# Patient Record
Sex: FEMALE | Race: WHITE | NOT HISPANIC OR LATINO | Employment: OTHER | URBAN - METROPOLITAN AREA
[De-identification: names, ages, dates, MRNs, and addresses within clinical notes are randomized per-mention and may not be internally consistent; named-entity substitution may affect disease eponyms.]

---

## 2017-07-03 ENCOUNTER — CONVERSION ENCOUNTER (OUTPATIENT)
Dept: MAMMOGRAPHY | Facility: HOSPITAL | Age: 62
End: 2017-07-03

## 2017-07-10 ENCOUNTER — CONVERSION ENCOUNTER (OUTPATIENT)
Dept: MAMMOGRAPHY | Facility: HOSPITAL | Age: 62
End: 2017-07-10

## 2018-09-06 ENCOUNTER — OFFICE VISIT CONVERTED (OUTPATIENT)
Dept: GASTROENTEROLOGY | Facility: CLINIC | Age: 63
End: 2018-09-06
Attending: PHYSICIAN ASSISTANT

## 2018-11-27 ENCOUNTER — CONVERSION ENCOUNTER (OUTPATIENT)
Dept: MAMMOGRAPHY | Facility: HOSPITAL | Age: 63
End: 2018-11-27

## 2019-12-10 ENCOUNTER — HOSPITAL ENCOUNTER (OUTPATIENT)
Dept: MAMMOGRAPHY | Facility: HOSPITAL | Age: 64
Discharge: HOME | End: 2019-12-10
Attending: OBSTETRICS & GYNECOLOGY

## 2020-07-21 ENCOUNTER — HOSPITAL ENCOUNTER (OUTPATIENT)
Dept: GENERAL RADIOLOGY | Facility: HOSPITAL | Age: 65
Discharge: HOME | End: 2020-07-21
Attending: NURSE PRACTITIONER

## 2020-08-05 ENCOUNTER — OFFICE VISIT CONVERTED (OUTPATIENT)
Dept: ORTHOPEDIC SURGERY | Facility: CLINIC | Age: 65
End: 2020-08-05
Attending: ORTHOPAEDIC SURGERY

## 2020-12-28 ENCOUNTER — APPOINTMENT (OUTPATIENT)
Dept: PREADMISSION TESTING | Facility: HOSPITAL | Age: 65
End: 2020-12-28

## 2020-12-28 VITALS
WEIGHT: 172 LBS | BODY MASS INDEX: 27 KG/M2 | TEMPERATURE: 98.1 F | DIASTOLIC BLOOD PRESSURE: 79 MMHG | HEART RATE: 68 BPM | OXYGEN SATURATION: 96 % | HEIGHT: 67 IN | RESPIRATION RATE: 18 BRPM | SYSTOLIC BLOOD PRESSURE: 138 MMHG

## 2020-12-28 LAB
ANION GAP SERPL CALCULATED.3IONS-SCNC: 10 MMOL/L (ref 5–15)
BUN SERPL-MCNC: 14 MG/DL (ref 8–23)
BUN/CREAT SERPL: 22.6 (ref 7–25)
CALCIUM SPEC-SCNC: 9.6 MG/DL (ref 8.6–10.5)
CHLORIDE SERPL-SCNC: 105 MMOL/L (ref 98–107)
CO2 SERPL-SCNC: 24 MMOL/L (ref 22–29)
CREAT SERPL-MCNC: 0.62 MG/DL (ref 0.57–1)
DEPRECATED RDW RBC AUTO: 40 FL (ref 37–54)
ERYTHROCYTE [DISTWIDTH] IN BLOOD BY AUTOMATED COUNT: 12.4 % (ref 12.3–15.4)
GFR SERPL CREATININE-BSD FRML MDRD: 97 ML/MIN/1.73
GLUCOSE SERPL-MCNC: 90 MG/DL (ref 65–99)
HCT VFR BLD AUTO: 40.3 % (ref 34–46.6)
HGB BLD-MCNC: 13.8 G/DL (ref 12–15.9)
MCH RBC QN AUTO: 30.3 PG (ref 26.6–33)
MCHC RBC AUTO-ENTMCNC: 34.2 G/DL (ref 31.5–35.7)
MCV RBC AUTO: 88.6 FL (ref 79–97)
PLATELET # BLD AUTO: 261 10*3/MM3 (ref 140–450)
PMV BLD AUTO: 11.4 FL (ref 6–12)
POTASSIUM SERPL-SCNC: 4.3 MMOL/L (ref 3.5–5.2)
QT INTERVAL: 428 MS
RBC # BLD AUTO: 4.55 10*6/MM3 (ref 3.77–5.28)
SODIUM SERPL-SCNC: 139 MMOL/L (ref 136–145)
WBC # BLD AUTO: 7.76 10*3/MM3 (ref 3.4–10.8)

## 2020-12-28 PROCEDURE — 80048 BASIC METABOLIC PNL TOTAL CA: CPT

## 2020-12-28 PROCEDURE — U0004 COV-19 TEST NON-CDC HGH THRU: HCPCS

## 2020-12-28 PROCEDURE — 93010 ELECTROCARDIOGRAM REPORT: CPT | Performed by: INTERNAL MEDICINE

## 2020-12-28 PROCEDURE — 85027 COMPLETE CBC AUTOMATED: CPT

## 2020-12-28 PROCEDURE — 93005 ELECTROCARDIOGRAM TRACING: CPT

## 2020-12-28 PROCEDURE — 36415 COLL VENOUS BLD VENIPUNCTURE: CPT

## 2020-12-28 PROCEDURE — C9803 HOPD COVID-19 SPEC COLLECT: HCPCS

## 2020-12-28 RX ORDER — LOSARTAN POTASSIUM 50 MG/1
50 TABLET ORAL DAILY
COMMUNITY
Start: 2020-10-26

## 2020-12-28 RX ORDER — NAPROXEN 500 MG/1
500 TABLET ORAL 2 TIMES DAILY PRN
COMMUNITY
End: 2023-03-15 | Stop reason: HOSPADM

## 2020-12-28 RX ORDER — ASPIRIN 81 MG/1
81 TABLET ORAL DAILY
COMMUNITY

## 2020-12-28 RX ORDER — SIMVASTATIN 20 MG
20 TABLET ORAL NIGHTLY
COMMUNITY
Start: 2020-12-07

## 2020-12-28 RX ORDER — BUPROPION HYDROCHLORIDE 100 MG/1
100 TABLET, EXTENDED RELEASE ORAL 2 TIMES DAILY
COMMUNITY
Start: 2020-12-07

## 2020-12-28 RX ORDER — DEXTROMETHORPHAN HYDROBROMIDE AND PROMETHAZINE HYDROCHLORIDE 15; 6.25 MG/5ML; MG/5ML
SYRUP ORAL 4 TIMES DAILY PRN
COMMUNITY
Start: 2020-11-18 | End: 2023-02-17

## 2020-12-28 RX ORDER — ESTRADIOL 0.5 MG/1
0.5 TABLET ORAL DAILY
COMMUNITY
Start: 2020-10-31 | End: 2021-10-01 | Stop reason: SDUPTHER

## 2020-12-29 LAB — SARS-COV-2 RNA RESP QL NAA+PROBE: NOT DETECTED

## 2020-12-31 ENCOUNTER — ANESTHESIA (OUTPATIENT)
Dept: PERIOP | Facility: HOSPITAL | Age: 65
End: 2020-12-31

## 2020-12-31 ENCOUNTER — HOSPITAL ENCOUNTER (OUTPATIENT)
Facility: HOSPITAL | Age: 65
Discharge: HOME | End: 2021-01-01
Attending: OBSTETRICS & GYNECOLOGY | Admitting: OBSTETRICS & GYNECOLOGY

## 2020-12-31 ENCOUNTER — ANESTHESIA EVENT (OUTPATIENT)
Dept: PERIOP | Facility: HOSPITAL | Age: 65
End: 2020-12-31
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
## 2020-12-31 DIAGNOSIS — N99.3 PROLAPSE, POST-HYSTERECTOMY: ICD-10-CM

## 2020-12-31 LAB
HBV SURFACE AG SERPL QL IA: NORMAL
HCV AB SER DONR QL: NORMAL
HIV1+2 AB SER QL: NORMAL

## 2020-12-31 PROCEDURE — C1763 CONN TISS, NON-HUMAN: HCPCS | Performed by: OBSTETRICS & GYNECOLOGY

## 2020-12-31 PROCEDURE — 25010000002 MAGNESIUM SULFATE PER 500 MG OF MAGNESIUM: Performed by: NURSE ANESTHETIST, CERTIFIED REGISTERED

## 2020-12-31 PROCEDURE — 25010000002 PHENYLEPHRINE PER 1 ML: Performed by: NURSE ANESTHETIST, CERTIFIED REGISTERED

## 2020-12-31 PROCEDURE — 25010000002 MIDAZOLAM PER 1 MG: Performed by: NURSE ANESTHETIST, CERTIFIED REGISTERED

## 2020-12-31 PROCEDURE — 25010000002 NEOSTIGMINE PER 0.5 MG: Performed by: NURSE ANESTHETIST, CERTIFIED REGISTERED

## 2020-12-31 PROCEDURE — 25010000002 HYDROMORPHONE PER 4 MG: Performed by: NURSE ANESTHETIST, CERTIFIED REGISTERED

## 2020-12-31 PROCEDURE — 25010000003 CEFAZOLIN IN DEXTROSE 2-4 GM/100ML-% SOLUTION: Performed by: NURSE ANESTHETIST, CERTIFIED REGISTERED

## 2020-12-31 PROCEDURE — 25010000002 ALBUMIN HUMAN 5% PER 50 ML: Performed by: NURSE ANESTHETIST, CERTIFIED REGISTERED

## 2020-12-31 PROCEDURE — P9041 ALBUMIN (HUMAN),5%, 50ML: HCPCS | Performed by: NURSE ANESTHETIST, CERTIFIED REGISTERED

## 2020-12-31 PROCEDURE — G0378 HOSPITAL OBSERVATION PER HR: HCPCS

## 2020-12-31 PROCEDURE — 25010000003 CEFAZOLIN IN DEXTROSE 2-4 GM/100ML-% SOLUTION: Performed by: STUDENT IN AN ORGANIZED HEALTH CARE EDUCATION/TRAINING PROGRAM

## 2020-12-31 PROCEDURE — 86803 HEPATITIS C AB TEST: CPT | Performed by: OBSTETRICS & GYNECOLOGY

## 2020-12-31 PROCEDURE — 25010000002 EPINEPHRINE PER 0.1 MG: Performed by: OBSTETRICS & GYNECOLOGY

## 2020-12-31 PROCEDURE — 25010000002 DEXAMETHASONE PER 1 MG: Performed by: NURSE ANESTHETIST, CERTIFIED REGISTERED

## 2020-12-31 PROCEDURE — 88305 TISSUE EXAM BY PATHOLOGIST: CPT | Performed by: OBSTETRICS & GYNECOLOGY

## 2020-12-31 PROCEDURE — 25010000002 ONDANSETRON PER 1 MG: Performed by: NURSE ANESTHETIST, CERTIFIED REGISTERED

## 2020-12-31 PROCEDURE — 25010000002 PROPOFOL 10 MG/ML EMULSION: Performed by: NURSE ANESTHETIST, CERTIFIED REGISTERED

## 2020-12-31 PROCEDURE — 87340 HEPATITIS B SURFACE AG IA: CPT | Performed by: OBSTETRICS & GYNECOLOGY

## 2020-12-31 PROCEDURE — G0432 EIA HIV-1/HIV-2 SCREEN: HCPCS | Performed by: OBSTETRICS & GYNECOLOGY

## 2020-12-31 PROCEDURE — 25010000002 FENTANYL CITRATE (PF) 100 MCG/2ML SOLUTION: Performed by: NURSE ANESTHETIST, CERTIFIED REGISTERED

## 2020-12-31 PROCEDURE — 87521 HEPATITIS C PROBE&RVRS TRNSC: CPT | Performed by: OBSTETRICS & GYNECOLOGY

## 2020-12-31 DEVICE — POLYPROPYLENE MESH FOR SACROCOLPOSUSPENSION/SACROCOLPOPEXY - Y
Type: IMPLANTABLE DEVICE | Site: VAGINA | Status: FUNCTIONAL
Brand: RESTORELLE

## 2020-12-31 RX ORDER — LOSARTAN POTASSIUM 50 MG/1
50 TABLET ORAL DAILY
Status: DISCONTINUED | OUTPATIENT
Start: 2020-12-31 | End: 2021-01-01 | Stop reason: HOSPADM

## 2020-12-31 RX ORDER — NALOXONE HCL 0.4 MG/ML
0.2 VIAL (ML) INJECTION AS NEEDED
Status: DISCONTINUED | OUTPATIENT
Start: 2020-12-31 | End: 2020-12-31 | Stop reason: HOSPADM

## 2020-12-31 RX ORDER — LIDOCAINE HYDROCHLORIDE 20 MG/ML
INJECTION, SOLUTION INFILTRATION; PERINEURAL AS NEEDED
Status: DISCONTINUED | OUTPATIENT
Start: 2020-12-31 | End: 2020-12-31 | Stop reason: SURG

## 2020-12-31 RX ORDER — LABETALOL HYDROCHLORIDE 5 MG/ML
5 INJECTION, SOLUTION INTRAVENOUS
Status: DISCONTINUED | OUTPATIENT
Start: 2020-12-31 | End: 2020-12-31 | Stop reason: HOSPADM

## 2020-12-31 RX ORDER — HYDROCODONE BITARTRATE AND ACETAMINOPHEN 7.5; 325 MG/1; MG/1
1 TABLET ORAL ONCE AS NEEDED
Status: DISCONTINUED | OUTPATIENT
Start: 2020-12-31 | End: 2020-12-31 | Stop reason: HOSPADM

## 2020-12-31 RX ORDER — PROMETHAZINE HYDROCHLORIDE 25 MG/1
25 SUPPOSITORY RECTAL ONCE AS NEEDED
Status: DISCONTINUED | OUTPATIENT
Start: 2020-12-31 | End: 2020-12-31 | Stop reason: HOSPADM

## 2020-12-31 RX ORDER — FENTANYL CITRATE 50 UG/ML
50 INJECTION, SOLUTION INTRAMUSCULAR; INTRAVENOUS
Status: DISCONTINUED | OUTPATIENT
Start: 2020-12-31 | End: 2020-12-31 | Stop reason: HOSPADM

## 2020-12-31 RX ORDER — MORPHINE SULFATE 2 MG/ML
1 INJECTION, SOLUTION INTRAMUSCULAR; INTRAVENOUS EVERY 4 HOURS PRN
Status: DISCONTINUED | OUTPATIENT
Start: 2020-12-31 | End: 2021-01-01 | Stop reason: HOSPADM

## 2020-12-31 RX ORDER — HYDROCODONE BITARTRATE AND ACETAMINOPHEN 5; 325 MG/1; MG/1
1 TABLET ORAL EVERY 4 HOURS PRN
Status: DISCONTINUED | OUTPATIENT
Start: 2020-12-31 | End: 2021-01-01 | Stop reason: HOSPADM

## 2020-12-31 RX ORDER — SODIUM CHLORIDE, SODIUM LACTATE, POTASSIUM CHLORIDE, CALCIUM CHLORIDE 600; 310; 30; 20 MG/100ML; MG/100ML; MG/100ML; MG/100ML
9 INJECTION, SOLUTION INTRAVENOUS CONTINUOUS
Status: DISCONTINUED | OUTPATIENT
Start: 2020-12-31 | End: 2020-12-31 | Stop reason: HOSPADM

## 2020-12-31 RX ORDER — ONDANSETRON 2 MG/ML
4 INJECTION INTRAMUSCULAR; INTRAVENOUS EVERY 6 HOURS PRN
Status: DISCONTINUED | OUTPATIENT
Start: 2020-12-31 | End: 2021-01-01 | Stop reason: HOSPADM

## 2020-12-31 RX ORDER — DEXTROSE MONOHYDRATE 25 G/50ML
INJECTION, SOLUTION INTRAVENOUS AS NEEDED
Status: DISCONTINUED | OUTPATIENT
Start: 2020-12-31 | End: 2020-12-31 | Stop reason: HOSPADM

## 2020-12-31 RX ORDER — HYDROCODONE BITARTRATE AND ACETAMINOPHEN 10; 325 MG/1; MG/1
1 TABLET ORAL EVERY 4 HOURS PRN
Status: DISCONTINUED | OUTPATIENT
Start: 2020-12-31 | End: 2021-01-01 | Stop reason: HOSPADM

## 2020-12-31 RX ORDER — FLUMAZENIL 0.1 MG/ML
0.2 INJECTION INTRAVENOUS AS NEEDED
Status: DISCONTINUED | OUTPATIENT
Start: 2020-12-31 | End: 2020-12-31 | Stop reason: HOSPADM

## 2020-12-31 RX ORDER — MIDAZOLAM HYDROCHLORIDE 1 MG/ML
INJECTION INTRAMUSCULAR; INTRAVENOUS AS NEEDED
Status: DISCONTINUED | OUTPATIENT
Start: 2020-12-31 | End: 2020-12-31 | Stop reason: SURG

## 2020-12-31 RX ORDER — SODIUM CHLORIDE 0.9 % (FLUSH) 0.9 %
10 SYRINGE (ML) INJECTION EVERY 12 HOURS SCHEDULED
Status: DISCONTINUED | OUTPATIENT
Start: 2020-12-31 | End: 2021-01-01 | Stop reason: HOSPADM

## 2020-12-31 RX ORDER — CEFAZOLIN SODIUM 2 G/100ML
INJECTION, SOLUTION INTRAVENOUS AS NEEDED
Status: DISCONTINUED | OUTPATIENT
Start: 2020-12-31 | End: 2020-12-31 | Stop reason: SURG

## 2020-12-31 RX ORDER — OXYCODONE AND ACETAMINOPHEN 7.5; 325 MG/1; MG/1
1 TABLET ORAL ONCE AS NEEDED
Status: DISCONTINUED | OUTPATIENT
Start: 2020-12-31 | End: 2020-12-31 | Stop reason: HOSPADM

## 2020-12-31 RX ORDER — FENTANYL CITRATE 50 UG/ML
INJECTION, SOLUTION INTRAMUSCULAR; INTRAVENOUS AS NEEDED
Status: DISCONTINUED | OUTPATIENT
Start: 2020-12-31 | End: 2020-12-31 | Stop reason: SURG

## 2020-12-31 RX ORDER — DIPHENHYDRAMINE HYDROCHLORIDE 50 MG/ML
12.5 INJECTION INTRAMUSCULAR; INTRAVENOUS
Status: DISCONTINUED | OUTPATIENT
Start: 2020-12-31 | End: 2020-12-31 | Stop reason: HOSPADM

## 2020-12-31 RX ORDER — SODIUM CHLORIDE 0.9 % (FLUSH) 0.9 %
10 SYRINGE (ML) INJECTION AS NEEDED
Status: DISCONTINUED | OUTPATIENT
Start: 2020-12-31 | End: 2020-12-31 | Stop reason: HOSPADM

## 2020-12-31 RX ORDER — GLYCOPYRROLATE 0.2 MG/ML
INJECTION INTRAMUSCULAR; INTRAVENOUS AS NEEDED
Status: DISCONTINUED | OUTPATIENT
Start: 2020-12-31 | End: 2020-12-31 | Stop reason: SURG

## 2020-12-31 RX ORDER — PROPOFOL 10 MG/ML
VIAL (ML) INTRAVENOUS AS NEEDED
Status: DISCONTINUED | OUTPATIENT
Start: 2020-12-31 | End: 2020-12-31 | Stop reason: SURG

## 2020-12-31 RX ORDER — DIPHENHYDRAMINE HCL 25 MG
25 CAPSULE ORAL
Status: DISCONTINUED | OUTPATIENT
Start: 2020-12-31 | End: 2020-12-31 | Stop reason: HOSPADM

## 2020-12-31 RX ORDER — ROCURONIUM BROMIDE 10 MG/ML
INJECTION, SOLUTION INTRAVENOUS AS NEEDED
Status: DISCONTINUED | OUTPATIENT
Start: 2020-12-31 | End: 2020-12-31 | Stop reason: SURG

## 2020-12-31 RX ORDER — SODIUM CHLORIDE 0.9 % (FLUSH) 0.9 %
10 SYRINGE (ML) INJECTION AS NEEDED
Status: DISCONTINUED | OUTPATIENT
Start: 2020-12-31 | End: 2021-01-01 | Stop reason: HOSPADM

## 2020-12-31 RX ORDER — ONDANSETRON 2 MG/ML
4 INJECTION INTRAMUSCULAR; INTRAVENOUS ONCE AS NEEDED
Status: DISCONTINUED | OUTPATIENT
Start: 2020-12-31 | End: 2020-12-31 | Stop reason: HOSPADM

## 2020-12-31 RX ORDER — ONDANSETRON 2 MG/ML
INJECTION INTRAMUSCULAR; INTRAVENOUS AS NEEDED
Status: DISCONTINUED | OUTPATIENT
Start: 2020-12-31 | End: 2020-12-31 | Stop reason: SURG

## 2020-12-31 RX ORDER — SCOLOPAMINE TRANSDERMAL SYSTEM 1 MG/1
1 PATCH, EXTENDED RELEASE TRANSDERMAL ONCE
Status: DISCONTINUED | OUTPATIENT
Start: 2020-12-31 | End: 2020-12-31

## 2020-12-31 RX ORDER — CEFAZOLIN SODIUM 2 G/100ML
2 INJECTION, SOLUTION INTRAVENOUS ONCE
Status: COMPLETED | OUTPATIENT
Start: 2020-12-31 | End: 2021-01-01

## 2020-12-31 RX ORDER — SODIUM CHLORIDE 0.9 % (FLUSH) 0.9 %
3 SYRINGE (ML) INJECTION EVERY 12 HOURS SCHEDULED
Status: DISCONTINUED | OUTPATIENT
Start: 2020-12-31 | End: 2020-12-31 | Stop reason: HOSPADM

## 2020-12-31 RX ORDER — DEXAMETHASONE SODIUM PHOSPHATE 4 MG/ML
INJECTION, SOLUTION INTRA-ARTICULAR; INTRALESIONAL; INTRAMUSCULAR; INTRAVENOUS; SOFT TISSUE AS NEEDED
Status: DISCONTINUED | OUTPATIENT
Start: 2020-12-31 | End: 2020-12-31 | Stop reason: SURG

## 2020-12-31 RX ORDER — AMOXICILLIN 250 MG
2 CAPSULE ORAL 2 TIMES DAILY
Status: DISCONTINUED | OUTPATIENT
Start: 2020-12-31 | End: 2021-01-01 | Stop reason: HOSPADM

## 2020-12-31 RX ORDER — SODIUM CHLORIDE 9 MG/ML
75 INJECTION, SOLUTION INTRAVENOUS CONTINUOUS
Status: DISCONTINUED | OUTPATIENT
Start: 2020-12-31 | End: 2021-01-01 | Stop reason: HOSPADM

## 2020-12-31 RX ORDER — HYDROMORPHONE HCL 110MG/55ML
PATIENT CONTROLLED ANALGESIA SYRINGE INTRAVENOUS AS NEEDED
Status: DISCONTINUED | OUTPATIENT
Start: 2020-12-31 | End: 2020-12-31 | Stop reason: SURG

## 2020-12-31 RX ORDER — EPHEDRINE SULFATE 50 MG/ML
5 INJECTION, SOLUTION INTRAVENOUS ONCE AS NEEDED
Status: DISCONTINUED | OUTPATIENT
Start: 2020-12-31 | End: 2020-12-31 | Stop reason: HOSPADM

## 2020-12-31 RX ORDER — HYDROMORPHONE HYDROCHLORIDE 1 MG/ML
0.5 INJECTION, SOLUTION INTRAMUSCULAR; INTRAVENOUS; SUBCUTANEOUS
Status: DISCONTINUED | OUTPATIENT
Start: 2020-12-31 | End: 2020-12-31 | Stop reason: HOSPADM

## 2020-12-31 RX ORDER — CALCIUM CARBONATE 200(500)MG
1 TABLET,CHEWABLE ORAL 2 TIMES DAILY PRN
Status: DISCONTINUED | OUTPATIENT
Start: 2020-12-31 | End: 2021-01-01 | Stop reason: HOSPADM

## 2020-12-31 RX ORDER — SODIUM CHLORIDE 0.9 % (FLUSH) 0.9 %
3-10 SYRINGE (ML) INJECTION AS NEEDED
Status: DISCONTINUED | OUTPATIENT
Start: 2020-12-31 | End: 2020-12-31 | Stop reason: HOSPADM

## 2020-12-31 RX ORDER — ALBUMIN, HUMAN INJ 5% 5 %
SOLUTION INTRAVENOUS CONTINUOUS PRN
Status: DISCONTINUED | OUTPATIENT
Start: 2020-12-31 | End: 2020-12-31 | Stop reason: SURG

## 2020-12-31 RX ORDER — MAGNESIUM SULFATE HEPTAHYDRATE 500 MG/ML
INJECTION, SOLUTION INTRAMUSCULAR; INTRAVENOUS AS NEEDED
Status: DISCONTINUED | OUTPATIENT
Start: 2020-12-31 | End: 2020-12-31 | Stop reason: SURG

## 2020-12-31 RX ORDER — SODIUM CHLORIDE 9 MG/ML
INJECTION, SOLUTION INTRAVENOUS AS NEEDED
Status: DISCONTINUED | OUTPATIENT
Start: 2020-12-31 | End: 2020-12-31 | Stop reason: HOSPADM

## 2020-12-31 RX ORDER — BUPROPION HYDROCHLORIDE 100 MG/1
100 TABLET, EXTENDED RELEASE ORAL 2 TIMES DAILY
Status: DISCONTINUED | OUTPATIENT
Start: 2020-12-31 | End: 2021-01-01 | Stop reason: HOSPADM

## 2020-12-31 RX ORDER — LIDOCAINE HYDROCHLORIDE 10 MG/ML
0.5 INJECTION, SOLUTION EPIDURAL; INFILTRATION; INTRACAUDAL; PERINEURAL ONCE AS NEEDED
Status: DISCONTINUED | OUTPATIENT
Start: 2020-12-31 | End: 2020-12-31 | Stop reason: HOSPADM

## 2020-12-31 RX ORDER — MAGNESIUM HYDROXIDE 1200 MG/15ML
LIQUID ORAL AS NEEDED
Status: DISCONTINUED | OUTPATIENT
Start: 2020-12-31 | End: 2020-12-31 | Stop reason: HOSPADM

## 2020-12-31 RX ORDER — NALOXONE HCL 0.4 MG/ML
0.4 VIAL (ML) INJECTION
Status: DISCONTINUED | OUTPATIENT
Start: 2020-12-31 | End: 2021-01-01 | Stop reason: HOSPADM

## 2020-12-31 RX ORDER — MIDAZOLAM HYDROCHLORIDE 1 MG/ML
1 INJECTION INTRAMUSCULAR; INTRAVENOUS
Status: DISCONTINUED | OUTPATIENT
Start: 2020-12-31 | End: 2020-12-31 | Stop reason: HOSPADM

## 2020-12-31 RX ORDER — PROMETHAZINE HYDROCHLORIDE 25 MG/1
25 TABLET ORAL ONCE AS NEEDED
Status: DISCONTINUED | OUTPATIENT
Start: 2020-12-31 | End: 2020-12-31 | Stop reason: HOSPADM

## 2020-12-31 RX ORDER — FAMOTIDINE 10 MG/ML
20 INJECTION, SOLUTION INTRAVENOUS ONCE
Status: COMPLETED | OUTPATIENT
Start: 2020-12-31 | End: 2020-12-31

## 2020-12-31 RX ORDER — KETAMINE HYDROCHLORIDE 10 MG/ML
INJECTION INTRAMUSCULAR; INTRAVENOUS AS NEEDED
Status: DISCONTINUED | OUTPATIENT
Start: 2020-12-31 | End: 2020-12-31 | Stop reason: SURG

## 2020-12-31 RX ADMIN — HYDROCODONE BITARTRATE AND ACETAMINOPHEN 1 TABLET: 10; 325 TABLET ORAL at 21:44

## 2020-12-31 RX ADMIN — ALBUMIN HUMAN: 0.05 INJECTION, SOLUTION INTRAVENOUS at 13:07

## 2020-12-31 RX ADMIN — SODIUM CHLORIDE, PRESERVATIVE FREE 10 ML: 5 INJECTION INTRAVENOUS at 21:52

## 2020-12-31 RX ADMIN — CEFAZOLIN SODIUM 2 G: 2 INJECTION, SOLUTION INTRAVENOUS at 07:24

## 2020-12-31 RX ADMIN — FAMOTIDINE 20 MG: 10 INJECTION INTRAVENOUS at 07:02

## 2020-12-31 RX ADMIN — MAGNESIUM SULFATE HEPTAHYDRATE 2 G: 500 INJECTION, SOLUTION INTRAMUSCULAR; INTRAVENOUS at 08:20

## 2020-12-31 RX ADMIN — NEOSTIGMINE METHYLSULFATE 2 MG: 1 INJECTION INTRAMUSCULAR; INTRAVENOUS; SUBCUTANEOUS at 13:36

## 2020-12-31 RX ADMIN — KETAMINE HYDROCHLORIDE 10 MG: 10 INJECTION INTRAMUSCULAR; INTRAVENOUS at 09:09

## 2020-12-31 RX ADMIN — PHENYLEPHRINE HYDROCHLORIDE 200 MCG: 10 INJECTION INTRAVENOUS at 08:13

## 2020-12-31 RX ADMIN — PHENYLEPHRINE HYDROCHLORIDE 200 MCG: 10 INJECTION INTRAVENOUS at 07:53

## 2020-12-31 RX ADMIN — GLYCOPYRROLATE 0.4 MG: 0.2 INJECTION INTRAMUSCULAR; INTRAVENOUS at 13:36

## 2020-12-31 RX ADMIN — ROCURONIUM BROMIDE 20 MG: 10 INJECTION INTRAVENOUS at 11:06

## 2020-12-31 RX ADMIN — ROCURONIUM BROMIDE 10 MG: 10 INJECTION INTRAVENOUS at 12:49

## 2020-12-31 RX ADMIN — CEFAZOLIN SODIUM 2 G: 2 INJECTION, SOLUTION INTRAVENOUS at 11:24

## 2020-12-31 RX ADMIN — SODIUM CHLORIDE, POTASSIUM CHLORIDE, SODIUM LACTATE AND CALCIUM CHLORIDE 9 ML/HR: 600; 310; 30; 20 INJECTION, SOLUTION INTRAVENOUS at 07:01

## 2020-12-31 RX ADMIN — SCOPALAMINE 1 PATCH: 1 PATCH, EXTENDED RELEASE TRANSDERMAL at 07:02

## 2020-12-31 RX ADMIN — DEXAMETHASONE SODIUM PHOSPHATE 8 MG: 4 INJECTION INTRA-ARTICULAR; INTRALESIONAL; INTRAMUSCULAR; INTRAVENOUS; SOFT TISSUE at 09:04

## 2020-12-31 RX ADMIN — ROCURONIUM BROMIDE 20 MG: 10 INJECTION INTRAVENOUS at 09:26

## 2020-12-31 RX ADMIN — PHENYLEPHRINE HYDROCHLORIDE 100 MCG: 10 INJECTION INTRAVENOUS at 11:59

## 2020-12-31 RX ADMIN — SODIUM CHLORIDE, POTASSIUM CHLORIDE, SODIUM LACTATE AND CALCIUM CHLORIDE: 600; 310; 30; 20 INJECTION, SOLUTION INTRAVENOUS at 12:46

## 2020-12-31 RX ADMIN — FENTANYL CITRATE 100 MCG: 50 INJECTION INTRAMUSCULAR; INTRAVENOUS at 07:34

## 2020-12-31 RX ADMIN — ROCURONIUM BROMIDE 50 MG: 10 INJECTION INTRAVENOUS at 07:42

## 2020-12-31 RX ADMIN — PROPOFOL 150 MG: 10 INJECTION, EMULSION INTRAVENOUS at 07:41

## 2020-12-31 RX ADMIN — SODIUM CHLORIDE 75 ML/HR: 9 INJECTION, SOLUTION INTRAVENOUS at 16:09

## 2020-12-31 RX ADMIN — LIDOCAINE HYDROCHLORIDE 100 MG: 20 INJECTION, SOLUTION INFILTRATION; PERINEURAL at 07:41

## 2020-12-31 RX ADMIN — BUPROPION HYDROCHLORIDE 100 MG: 100 TABLET, EXTENDED RELEASE ORAL at 21:46

## 2020-12-31 RX ADMIN — KETAMINE HYDROCHLORIDE 40 MG: 10 INJECTION INTRAMUSCULAR; INTRAVENOUS at 08:09

## 2020-12-31 RX ADMIN — PHENYLEPHRINE HYDROCHLORIDE 200 MCG: 10 INJECTION INTRAVENOUS at 07:46

## 2020-12-31 RX ADMIN — DOCUSATE SODIUM 50MG AND SENNOSIDES 8.6MG 2 TABLET: 8.6; 5 TABLET, FILM COATED ORAL at 21:44

## 2020-12-31 RX ADMIN — HYDROMORPHONE HYDROCHLORIDE 0.5 MG: 2 INJECTION, SOLUTION INTRAMUSCULAR; INTRAVENOUS; SUBCUTANEOUS at 13:47

## 2020-12-31 RX ADMIN — SODIUM CHLORIDE, POTASSIUM CHLORIDE, SODIUM LACTATE AND CALCIUM CHLORIDE 9 ML/HR: 600; 310; 30; 20 INJECTION, SOLUTION INTRAVENOUS at 06:25

## 2020-12-31 RX ADMIN — MIDAZOLAM 2 MG: 1 INJECTION INTRAMUSCULAR; INTRAVENOUS at 08:06

## 2020-12-31 RX ADMIN — ONDANSETRON HYDROCHLORIDE 4 MG: 2 SOLUTION INTRAMUSCULAR; INTRAVENOUS at 13:15

## 2020-12-31 RX ADMIN — HYDROMORPHONE HYDROCHLORIDE 0.5 MG: 2 INJECTION, SOLUTION INTRAMUSCULAR; INTRAVENOUS; SUBCUTANEOUS at 13:35

## 2020-12-31 NOTE — ANESTHESIA PREPROCEDURE EVALUATION
Anesthesia Evaluation     Patient summary reviewed and Nursing notes reviewed   NPO Solid Status: > 8 hours  NPO Liquid Status: > 8 hours           Airway   Mallampati: II  Neck ROM: full  No difficulty expected  Dental - normal exam     Pulmonary     breath sounds clear to auscultation  Cardiovascular     Rhythm: regular    (+) hypertension, hyperlipidemia,       Neuro/Psych  (+) psychiatric history Depression,     GI/Hepatic/Renal/Endo      Musculoskeletal     Abdominal    Substance History      OB/GYN          Other                        Anesthesia Plan    ASA 2     general     intravenous induction     Anesthetic plan, all risks, benefits, and alternatives have been provided, discussed and informed consent has been obtained with: patient.

## 2020-12-31 NOTE — ANESTHESIA POSTPROCEDURE EVALUATION
Patient: Lorena Thorne    Procedure Summary     Date: 12/31/20 Room / Location: [facility] OR 09 / [facility] MAIN OR    Anesthesia Start: 0729 Anesthesia Stop: 1415    Procedure: ROBOTIC LAPAROSCOPIC SACRAL COLPOPEXY, BILATERAL SALPINGO-OOPHORECTOMY, CYSTOSCOPY, POSTERIOR REPAIR, CYSTOTOMY REPAIR (N/A Abdomen) Diagnosis:     Surgeon: Filiberto Alberts MD Provider: Edward Rey MD    Anesthesia Type: general ASA Status: 2          Anesthesia Type: general    Vitals  Vitals Value Taken Time   /71 12/31/20 1530   Temp 36.4 °C (97.6 °F) 12/31/20 1530   Pulse 89 12/31/20 1545   Resp 16 12/31/20 1530   SpO2 98 % 12/31/20 1545   Vitals shown include unvalidated device data.        Anesthesia Post Evaluation

## 2020-12-31 NOTE — ANESTHESIA PROCEDURE NOTES
Airway  Urgency: elective    Date/Time: 12/31/2020 7:44 AM  Airway not difficult    General Information and Staff    Patient location during procedure: OR  Anesthesiologist: Edward Rey MD  CRNA: Brielle Meehan CRNA    Indications and Patient Condition  Indications for airway management: airway protection    Preoxygenated: yes  Mask difficulty assessment: 2 - vent by mask + OA or adjuvant +/- NMBA    Final Airway Details  Final airway type: endotracheal airway      Successful airway: ETT  Cuffed: yes   Successful intubation technique: direct laryngoscopy  Facilitating devices/methods: cricoid pressure  Endotracheal tube insertion site: oral  Blade: Aguiar  Blade size: 2  ETT size (mm): 7.0  Cormack-Lehane Classification: grade IIa - partial view of glottis  Placement verified by: chest auscultation and capnometry   Cuff volume (mL): 4  Measured from: lips  ETT/EBT  to lips (cm): 21  Number of attempts at approach: 1  Assessment: lips, teeth, and gum same as pre-op    Additional Comments  EBBS: ETCO2+: Atraumatic intubation

## 2021-01-01 VITALS
SYSTOLIC BLOOD PRESSURE: 112 MMHG | WEIGHT: 170.86 LBS | BODY MASS INDEX: 26.82 KG/M2 | TEMPERATURE: 97.5 F | DIASTOLIC BLOOD PRESSURE: 60 MMHG | HEIGHT: 67 IN | RESPIRATION RATE: 18 BRPM | OXYGEN SATURATION: 98 % | HEART RATE: 76 BPM

## 2021-01-01 PROCEDURE — G0378 HOSPITAL OBSERVATION PER HR: HCPCS

## 2021-01-01 RX ORDER — ONDANSETRON 4 MG/1
4 TABLET, FILM COATED ORAL DAILY PRN
Qty: 30 TABLET | Refills: 1 | Status: SHIPPED | OUTPATIENT
Start: 2021-01-01 | End: 2022-01-01

## 2021-01-01 RX ORDER — ONDANSETRON 4 MG/1
4 TABLET, FILM COATED ORAL DAILY PRN
Qty: 30 TABLET | Refills: 1 | Status: SHIPPED | OUTPATIENT
Start: 2021-01-01 | End: 2021-01-01 | Stop reason: SDUPTHER

## 2021-01-01 RX ORDER — AMOXICILLIN 250 MG
2 CAPSULE ORAL 2 TIMES DAILY
Qty: 30 TABLET | Refills: 1 | Status: SHIPPED | OUTPATIENT
Start: 2021-01-01 | End: 2023-02-17

## 2021-01-01 RX ORDER — OXYCODONE HYDROCHLORIDE AND ACETAMINOPHEN 5; 325 MG/1; MG/1
1 TABLET ORAL EVERY 6 HOURS PRN
Qty: 15 TABLET | Refills: 0 | Status: SHIPPED | OUTPATIENT
Start: 2021-01-01 | End: 2023-02-17

## 2021-01-01 RX ADMIN — BUPROPION HYDROCHLORIDE 100 MG: 100 TABLET, EXTENDED RELEASE ORAL at 09:10

## 2021-01-01 RX ADMIN — LOSARTAN POTASSIUM 50 MG: 50 TABLET, FILM COATED ORAL at 09:11

## 2021-01-01 RX ADMIN — DOCUSATE SODIUM 50MG AND SENNOSIDES 8.6MG 2 TABLET: 8.6; 5 TABLET, FILM COATED ORAL at 09:14

## 2021-01-01 RX ADMIN — SODIUM CHLORIDE 75 ML/HR: 9 INJECTION, SOLUTION INTRAVENOUS at 06:15

## 2021-01-01 RX ADMIN — HYDROCODONE BITARTRATE AND ACETAMINOPHEN 1 TABLET: 10; 325 TABLET ORAL at 11:02

## 2021-01-01 NOTE — PLAN OF CARE
Goal Outcome Evaluation:  Plan of Care Reviewed With: patient  Progress: improving  Outcome Summary: Lap sites x4 with bandaids are clean, dry and intact, adequate pain control with Norco, IVF infusing, received IV antibiotic, walked in bryant, vss, afebrile, encouraged increased use of IS, jolley in place has good urine output, scant bleedin on deangelo pad, will continue to monitor

## 2021-01-01 NOTE — DISCHARGE SUMMARY
Inpatient Discharge Summary    BRIEF OVERVIEW  Admitting Provider: Filiberto Alberts MD  Discharge Provider: Filiberto Alberts MD  Primary Care Physician at Discharge: Reji Nam [phone]     Admission Date: 12/31/2020     Discharge Date: 1/1/2021  Primary Discharge Diagnoses  Post-Hysterectomy Prolapse  Cystostomy repair    Discharge Disposition  Discharge to home  Code Status at Discharge: Full    Active Issues Requiring Follow-up  Jolley removal in 7 days    Outpatient Follow-Up  Jolley removal in 7 days and then 6 week postoperative visit.    Additional Instructions for the Follow-ups that You Need to Schedule       Chlorhexidine Skin Prep   Jan 04, 2021      Chlorhexidine Skin Prep and Instructions For All Patients Having A Procedure Requiring an Outward Incision if Not Allergic. If Allergic, Give Antibacterial Skin Wipes and Instructions. Do Not Use For Facial Cases or on Any Mucus Membranes.    Order Comments: Chlorhexidine Skin Prep and Instructions For All Patients Having A Procedure Requiring an Outward Incision if Not Allergic. If Allergic, Give Antibacterial Skin Wipes and Instructions. Do Not Use For Facial Cases or on Any Mucus Membranes.                Test Results Pending at Discharge  Pending Labs       Order Current Status    HCV RNA, PCR, Qualitative In process    Needlestick Pt Source In process    Tissue Pathology Exam In process            DETAILS OF HOSPITAL STAY    Presenting Problem/History of Present Illness  Prolapse, post-hysterectomy [N99.3]    Hospital Course  Patient was admitted on 12/31/2020 for her scheduled procedure.  On 12/31/2020, she underwent the below procedure complicated by a cystotomy that was repaire.  Please see the operative report for details.  By the time of her discharge on post-operative day one, she was stable and meeting all post-operative milestones including tolerating an oral intake, ambulating.  Her jolley catheter will remain in place for 7 days.  She was discharged to home and will follow up in 7 days for jolley catheter removal and in 4-6 weeks for a post-operative appointment.  She was given ED and return precautions.    Operative Procedures Performed  Procedure(s):  ROBOTIC LAPAROSCOPIC SACRAL COLPOPEXY, BILATERAL SALPINGO-OOPHORECTOMY, CYSTOSCOPY, POSTERIOR REPAIR, CYSTOTOMY REPAIR      Physical Exam at Discharge  Discharge Condition: good  Heart Rate: 82  Resp: 18  BP: 102/55  Temp: 99.4 °F (37.4 °C)  Weight: 77.5 kg (170 lb 13.7 oz)    Vitals:   Vitals:    12/31/20 1709 12/31/20 2222 01/01/21 0130 01/01/21 0534   BP: 113/62 112/59 94/52 102/55   BP Location:  Left arm Left arm Left arm   Patient Position:  Lying Lying Lying   Pulse: 91 84 86 82   Resp:  18 18 18   Temp:  100.5 °F (38.1 °C) 98.5 °F (36.9 °C) 99.4 °F (37.4 °C)   TempSrc:  Oral Oral Oral   SpO2: 96% 97% 94% 95%   Weight:       Height:           I/O:   I/O last 3 completed shifts:  In: 4696.3 [P.O.:490; I.V.:3706.3; IV Piggyback:500]  Out: 2875 [Urine:2775; Blood:100]  No intake/output data recorded.    Physical Exam   Cardiovascular: Normal rate  Pulmonary/Chest: Effort normal  Abdominal: Soft, mildly distended. Minimal tenderness with palpation.  No rebound or guarding.  Incisions: c/d/i  Genitourinary: jolley with clear urine in bag.  Vaginal pad with minimal spotting  Extremities: legs symmetric with no erythema or edema    Christy Jensen MD, PGY-7  Fellow, Female Pelvic Medicine & Reconstructive Surgery  [facility]

## 2021-01-01 NOTE — DISCHARGE INSTRUCTIONS
" Surgery for Pelvic Organ Prolapse      You should expect light vaginal spotting or discharge off and on for 2-4 weeks.    LET YOUR HEALTH CARE PROVIDER KNOW ABOUT:  • Any allergies you have.  • All medicines you are taking, including vitamins, herbs, eye drops, creams, and over-the-counter medicines.  • Previous problems you or members of your family have had with the use of anesthetics.• Any blood disorders you have.  • Previous surgeries you have had.  • Medical conditions you have.    RISKS AND POSSIBLE COMPLICATIONS  Generally, this is a safe procedure. You do not have any bandages to remove. The procedure takes 2-3 housr to perform, and generally recovery is quick. Most women feel like they returned to \"normal\" in 2-4 weeks.      However, as with any procedure, complications can occur. Possible complications include:  • Bleeding, particularly in the urine or from vagina. It is normal to have blood in the urine (pink urine) the first few times you void after the surgery, but if this persists or urine is thick and bright red please contact your doctor.  • Bleeding from vagina: If you have very heavy bleeding (soaking >1 pad per hour or \"gushing blood\"), please contact your doctor immediately. Light bleeding or spotting is very normal.  • Urinary tract infection. If you have burning in the urine after the first day, fever, pain in the lower abdomen not helped by pain medication, please call your doctor  • Injury to the bladder, urethra, or surrounding organs. This is very unlikely, and your doctor will tell you if there are any special precautions you need to take  •Problems related to the use of anesthetics. The most common are nausea or constipation. It may take up to 3-5 days to have a bowel movement after surgery. You can take colace (over the counter stool softener) 100 mg once or twice a day to help stool be softer, or even take miralax 17 grams a night to help with bowel movements. We recommend a high " fiber diet and/or probiotics to help your guts be healthy after surgery.  • You may have difficulty urinating after surgery. The nurses in the recovery area will do a test to see if you can empty your bladder after surgery. One in 3 women need to use a catheter (Coyne) in the bladder at home for 3-5 days after the procedure, and your nurse/doctor will let you know if you need this and how to use it. If you go home with a catheter, you doctor will make an appointment for you in the clinic in 3-5 days to remove the catheter  • You may have leaking of urine. No procedure to help with pelvic floor problems is 100% in solving all leakage issues, and your doctor would have let you know before the surgery the chances of being dry or having leakage after your prolapse surgery.  Some women also have a surgery for leakage (sling surgery) at the time of their prolapse surgery; you should know from your plan with your doctor whether or not this was planned.    PROCEDURE  • You will be given a medicine that makes you go to sleep (general anesthetic) or a medicine injected into your spine that numbs your body below the waist (spinal anesthetic).  • The surgeon will operate through the vagina to suspend/lift the vagina and attach it to natural ligaments in your pelvis.  The doctor may also have removed your uterus (done a hysterectomy) if you have not had your uterus removed in a prior surgery.  • The surgeon uses a camera in the bladder (a cystoscope) to look inside your bladder and the tube you urinate from (the urethra).    AFTER THE PROCEDURE  • You should expect some vaginal bleeding or spotting, like the light day of a period, and this may come and go for a few weeks after the surgery. If you have very heavy bleeding (like discussed above) please call your doctor.   • You will be taken to a recovery area where your progress will be monitored. Once you are awake and stable, you will likely be moved to a regular hospital  room.  • You will have some pain after the surgery, and different women have different levels of pain. Your doctor will prescribe pain medication for use while you are in the hospital and for at home after the surgery. Your doctor and nurses will tell you what medicines you are to use and how to take them.  • You may walk around, climb stairs, eat regular food, shower, and do normal activities after this surgery. You will probably feel more tired for 1-2 weeks after the surgery due to the anesthesia. We do not recommend that you do very dangerous, high-impact, or aggressive physical activity (where there is a high risk of falling or straining) for 4-6 weeks after the surgery.  • You may return to sexual intercourse 6 weeks after this procedure.  • You may return to work or school 6 weeks after this procedure. If you and your doctor have not arranged or talked about this before the surgery, please talk to your doctor at your follow up clinic visit after surgery.  • You may drive again when no longer using narcotic pain medicine (medication with hydrocodone, oxycodone, morphine, codeine, etc.) and when you feel that you can comfortably make sudden movements that are required for safe driving. For most people, this is 1-2 weeks after surgery.  • We do not recommend that you leave town or travel long distances for 4-6 weeks after the surgery due to dangers associated with long car/plane trips (risk of blood clots in legs or lungs) or risk of not being able to access your doctor if there are issues related to the surgery. If you and your doctor are not already planning on your leaving town sooner than this, please discuss with your physician.  • You may have a thin, flexible tube (catheter) in your bladder to drain urine. This may stay in place until your bladder is working properly on its own. The catheter may be removed before you are discharged, or it may stay in place when you go home (see above).  • You may not have  a bowel movement for 3-7 days after the surgery; this is normal. If you are eating, holding down food, and passing gas from your bottom this is okay. If you are more than 5-7 days after the surgery and feel there is hard stool in your rectum, you may take miralax 17 grams per night or other over the counter laxatives like dulcolax 10 mg orally or in the bottom to help pass the bowel movement.  • You will follow up with your physician when planned (usually 6 weeks after the procedure or sooner if you left the hospital with a catheter in place). Please call the office of Dr. Alberts at [phone] or [phone] or [phone] to make an appointment, or if you have an urgent question or problem. If it is after hours (nighttime) or weekends, please call the answering service at [phone] to reach a physician that can help you and to get in touch with your doctor.

## 2021-01-04 LAB
CYTO UR: NORMAL
HCV RNA SERPL QL NAA+PROBE: NEGATIVE
LAB AP CASE REPORT: NORMAL
PATH REPORT.FINAL DX SPEC: NORMAL
PATH REPORT.GROSS SPEC: NORMAL

## 2021-01-04 NOTE — PROGRESS NOTES
Case Management Discharge Note      Final Note: Discharged home. Tosah Chaudhary, JENNY         Transportation Services  Private: Car    Final Discharge Disposition Code: 01 - home or self-care

## 2021-01-06 PROCEDURE — 25010000002 EPINEPHRINE PER 0.1 MG: Performed by: OBSTETRICS & GYNECOLOGY

## 2021-01-11 NOTE — OP NOTE
Operative note    Date of Service:  01/11/21  Time of Service:  12:16 EST    Surgical Staff: Surgeon(s) and Role:     * Filiberto Alberts MD - Primary   Additional Staff: Christy Jensen MD, fellow   Pre-operative diagnosis(es): Post-hysterectomy prolapse     Post-operative diagnosis(es): Same  Cystotomy repair     Procedure(s): Procedure(s):  ROBOTIC LAPAROSCOPIC SACRAL COLPOPEXY, BILATERAL SALPINGO-OOPHORECTOMY, CYSTOSCOPY, POSTERIOR REPAIR, CYSTOTOMY REPAIR     Antibiotics: cefazolin (Ancef) ordered on call to OR     Anesthesia: Type: General  ASA:  II     Objective      Operative findings: Pelvic adhesions   Specimens removed: ID Type Source Tests Collected by Time   1 (Not marked as sent) : VENOUS BLOOD Blood Blood, Venous Line NEEDLESTICK PT SOURCE Filiberto Alberts MD 12/31/2020 1133   A : BILATERAL FALLOPIAN TUBES AND OVARIES Tissue Ovaries, Bilateral with Fallopian Tubes TISSUE PATHOLOGY EXAM Filiberto Alberts MD 12/31/2020 0932          Output: EBL: 150 cc    No intake/output data recorded.     Blood products used: No   Drains: Urethral Catheter 16 Fr. (Active)      Implant Information: Implant Name Type Inv. Item Serial No.  Lot No. LRB No. Used Action   MESH RESTORELLE Y SHP [id number] - [id number] Implant MESH RESTORELLE Y SHP [id number]  COLOPLAST AKITLIN [id number] N/A 1 Implanted      Complications: Cystostomy, repaired intraoperatively       Condition: stable   Disposition: to PACU and then admit to  medical - surgical floor     Procedure in detail:      Patient was seen in the preoperative area. Risks, benefits and alternatives to the surgery were discussed and consents were reviewed. The patient was tested for COVID19 and was found to be negative. The patient was brought to the operating room with IV fluids running. General anesthesia was administered. Patient was then prepared and draped in the usual sterile fashion in dorsal lithotomy position.  A right angle retractor was placed into the  vagina. The anterior lip of the cervix was grasped with a single-tooth tenaculum and the cervix was sequentially dilated. The KRIS II uterine manipulator was placed without difficulty.       The umbilicus was grasped with 2 Allis clamps and an 10 mm vertical incision was made through the umbilicus.  An 10 mm Shelby trocar was then inserted and fastened bilaterally to the fascia with 0-Vicryl.  The abdomen and pelvis were then inspected as well as the anterior abdominal wall and moderate amount of pelvic adhesions to the vaginal cuff, left pelvic sidewall, and rectum.  A 12 mm assistant trocar was placed 10 cm left lateral to the umbilical trocar and an 8 cm robotic trocar was placed 10 cm lateral to the assistant port.  On the right side, an 8 cm robotic trocar was placed 10 cm lateral to the umbilical trocar and the second 8 cm robotic trocar was placed 10 cm lateral. All of the trocars were introduced under direct visualization. The patient was then placed in steep Trendelenburg position, and the robot was docked at a 45 degree angle to the axis of the patient on the patient's right.  A Xi robot was used. The Cadiere forceps were placed in arm 1, the monopolar scissors in arm 3 and the Bipolar forceps in arm 4.       The pelvic adhesions as described above were taken down with a combination of blunt, sharp, and cautery dissection.     A bilateral salpingo-oophorectomy was performed by skeletonizing the IP ligament and cauterizing it with the bipolar graspers and cutting them with the monopolar scissors. The ureters were visualized prior to performing this. The tubes and ovaries were placed in an Endocatch bag and removed from the pelvis.     Bowel was moved out of the pelvis, so that the sacral promontory was adequately visualized. The peritoneum over the promontory was then grasped and an incision was made in the peritoneum using the monopolar scissors.  The right common iliac artery was large and right over the  sacral promontory; this vessel was avoided. The dissection was carried down until the anterior longitudinal ligament was visualized. The peritoneal incision was then carried down along the right pelvic sidewall staying medial to the ureter and extending over the apex of the vaginal to the left lateral aspect of the vaginal cuff. A Lucite tj was then placed in the vagina.     The anterior pelvic peritoneum was grasped and incised with sharp scissors to attempt to dissect the bladder off the anterior vagina. During this, a cystotomy was made and immediately repaired with two layers of interrupted 3-0 Vicryl sutures. A cystoscopy was performed which showed a water-tight repair with bilateral efflux of urine from the ureters.     The bladder was dissected away from the anterior vaginal wall down to the level of the superior trigone. The peritoneum was then dissected away from the posterior vaginal wall and the rectovaginal space was then entered as the rectum was dissected away from the posterior vaginal wall.  The monopolar scissors and cadiere forcep were removed and the needle drivers inserted.  The Colpoplast Restorelle Y polypropylene mesh was then introduced into the abdomen with the abdomen with the anterior leaf cut to 6 cm and a posterior leaf cut to 8 cm. The anterior mesh was then affixed to what was thought to be the anterior vaginal wall, but upon re-evaluation, was found to be the anterior rectum. The sutures were removed and a rectum bubble test was performed suggesting no defects in the rectum. The mesh was taken out of the pelvis and soaked in saline.    The mesh was replaced in the pelvis and placed in the correct position. The mesh was affixed to the anterior vaginal wall in an interrupted fashion using 2-0 prolene. 4 stitches were placed at the corners and two additional stitches were placed using 2-0 PDS. The posterior leaf of the mesh was then affixed to the posterior vaginal wall using 2-0  prolene corner stitches followed by six 2-0 PDS sutures.  The sacral promontory was then again visualized and 2 sutures of 2-0 Prolene were then placed through the anterior longitudinal ligament with the first suture approximately 1 cm distal to the promontory and the first suture placed a centimeter proximal to the first. The tail of the mesh was then brought up towards the sacral promontory and the mesh was adjusted in a way such as to support the vagina without placing undue tension on the vagina. Excess mesh was then trimmed and the 2-0 Prolene sutures were then placed through the mesh and the mesh was then tied down to the sacral promontory.  The peritoneum overlying the mesh was re-approximated in a running non-locking fashion using 2-0 Monocryl with Lapra-Ty at the ends of the suture.     Excellent hemostasis was noted.  Following this, the Coyne catheter was removed and cystourehtroscopy was then performed. There was noted to be efflux of urine from bilateral ureteral orficies and there was no evidence of injury to the urethra. The bladder was then drained a Coyne catheter was then reinserted. The robot was then undocked. The assistant trocar was then removed and was then closed using a Danny-Toribio device and a suture of #0 Vicryl suture. The remaining trocars were removed while visualizing with the scope. Lastly the umbilical trocar was removed. The patient was taken out of [place] position and given 5 large breaths to expel all remaining carbon dioxide gas from the abdomen. The fascia of the umbilical trocar was then closed in a running fashion using 0 vicryl suture.  All remaining skin incisions were closed in a subcuticular fashion using 4-0 Monocryl.  Steri-Strips and Tegaderm were placed over all 5 incisions. Good hemostastis was noted from all trocar sites.     Attention was turned to perform the posterior colporrhaphy. Two Allis clamps were placed on the vaginal introitus at 5 and 7 o'clock  positions. The mucosa was injected with diluted epinephrine solution (0.625 mg in 500 mL of normal saline). An incision was made at the vaginal introitus medial to the clamp and extending down the perineum to create an inverted triangle. An inverted triangular incision was excised from the posterior perineum and extend cephalad on the posterior vagina close to the defect that was created earlier. The fascia over the levator muscles were dissected down and reapproximated in the midline using #0 Vicryl sutures taking care not to reapproximate the bulk of the posterior levator muscles. The vaginal mucosa was closed using #0 Vicryl suture in a running locking fashion. A crown stitch reapproximated the distal levator muscles using #0 Vicryl suture. The perineal body was then recreated by reapproximating the superficial transverse perineal, bulbocavernosus, and raphe of the external anal sphincter using a #1 Vicryl suture. The perineal skin was closed with 3-0 Vicryl in a running subcuticular fashion. Excellent hemostasis was noted at the end of the procedure. The patient tolerated the procedure well. All instrument and sponge counts were correct x2. The patient was awakened from general anesthesia and brought to the recovery room in a stable condition.     Dr. Alberts was present and scrubbed and participated in the entirety of the procedure.    Christy Jensen MD, PGY-7  Fellow, Female Pelvic Medicine & Reconstructive Surgery  [facility]

## 2021-03-15 ENCOUNTER — HOSPITAL ENCOUNTER (OUTPATIENT)
Dept: MAMMOGRAPHY | Facility: HOSPITAL | Age: 66
Discharge: HOME | End: 2021-03-15
Attending: OBSTETRICS & GYNECOLOGY

## 2021-05-04 ENCOUNTER — OFFICE VISIT CONVERTED (OUTPATIENT)
Dept: GASTROENTEROLOGY | Facility: CLINIC | Age: 66
End: 2021-05-04
Attending: NURSE PRACTITIONER

## 2021-05-10 NOTE — H&P
History and Physical      Patient Name: Lorena Thorne   Patient ID: [id number]   Sex: Female   YOB: 1955    Primary Care Provider: Reji Nam MD   Referring Provider: Reji Nam MD    Visit Date: August 5, 2020    Provider: Otilio Winston MD   Location: [facility]   Location Address: [address] [place] [address]  [id number]   Location Phone: [phone]          Chief Complaint  · Right hip pain      History Of Present Illness  Lorena Thorne is a 64 year old /White female who presents today to [facility].      The patient presents today for evaluation of right hip pain.  She reports the low back aches her and Dr. Dumas referred her. She reports this has been aching her over the last 6 months and has been working more at the [employer] in [place]. No groin pain. Patient reports some numbness down the lateral side of the thigh to the knee.               Past Medical History  Abdominal pain; Anxiety; Depression; Diverticulitis; Hemorrhoids; Hyperlipemia; Hyperlipidemia; Hypertension; Left Carpal Tunnel Syndrome         Past Surgical History  Appendectomy; C-sections; Cesarian Section; Cholecystectomy; Colonoscopy; Dilation and curretage; EGD; Gallbladder; Hysterectomy         Medication List  aspirin 81 mg oral tablet,delayed release (DR/EC); bupropion HCl 100 mg oral tablet; buspirone 5 mg oral tablet; estradiol 0.5 mg oral tablet; Fiber Con 625 mg; hyoscyamine sulfate 0.125 mg oral tablet,disintegrating; loratadine 10 mg oral capsule; melatonin 5 mg oral capsule; naproxen 500 mg oral tablet; simvastatin 20 mg Oral tablet; Vitamin B Complex Sublingual; Vitamin D3 2,000 unit oral capsule; zolpidem 10 mg oral tablet         Allergy List  NO KNOWN DRUG ALLERGIES         Family Medical History  Cancer, Unspecified; - No Family History of Colorectal Cancer; Family history of Arthritis         Social History  Alcohol (Never); Alcohol Use (Never); lives with spouse;  ".; Recreational Drug Use (Never); Retired.; Tobacco (Never); Working         Review of Systems  · Constitutional  o Denies  o : fever, chills, weight loss  · Cardiovascular  o Denies  o : chest pain, shortness of breath  · Gastrointestinal  o Denies  o : liver disease, heartburn, nausea, blood in stools  · Genitourinary  o Denies  o : painful urination, blood in urine  · Integument  o Denies  o : rash, itching  · Neurologic  o Denies  o : headache, weakness, loss of consciousness  · Musculoskeletal  o Denies  o : painful, swollen joints  · Psychiatric  o Denies  o : drug/alcohol addiction, anxiety, depression      Vitals  Date Time BP Position Site L\R Cuff Size HR RR TEMP (F) WT  HT  BMI kg/m2 BSA m2 O2 Sat        08/05/2020 09:43 AM         165lbs 0oz 5'  6\" 26.63 1.87           Physical Examination  · Constitutional  o Appearance  o : well developed, well-nourished, no obvious deformities present  · Head and Face  o Head  o :   § Inspection  § : normocephalic  o Face  o :   § Inspection  § : no facial lesions  · Eyes  o Conjunctivae  o : conjunctivae normal  o Sclerae  o : sclerae white  · Ears, Nose, Mouth and Throat  o Ears  o :   § External Ears  § : appearance within normal limits  § Hearing  § : intact  o Nose  o :   § External Nose  § : appearance normal  · Neck  o Inspection/Palpation  o : normal appearance  o Range of Motion  o : full range of motion  · Respiratory  o Respiratory Effort  o : breathing unlabored  o Inspection of Chest  o : normal appearance  o Auscultation of Lungs  o : no audible wheezing or rales  · Cardiovascular  o Heart  o : regular rate  · Gastrointestinal  o Abdominal Examination  o : soft and non-tender  · Skin and Subcutaneous Tissue  o General Inspection  o : intact, no rashes  · Psychiatric  o General  o : Alert and oriented x3  o Judgement and Insight  o : judgment and insight intact  o Mood and Affect  o : mood normal, affect appropriate  · Right " Hip  o Inspection  o : Intact flexion and extension, good ER and IR, Neurovascularly intact, sensation grossly intact, tenderness over the SI joint, Non-tender to lateral hip, no groin pain with ROM, good strength  · Injection Note/Aspiration Note  o Site  o : IM  o Procedure  o : Procedure: After educating the patient, patient gave consent for the procedure. After using alcohol prep, injection was given. The patient tolerated the procedure well.   o Medication  o : 2ml's of 4 mg Dexamethasone   · Imaging  o Imaging  o : Xray hip July 2020: No evidence of acute fracture or dislocation. The hip joint spaces are fairly well maintained. Mild bilateral SI joint DJD. Partially imaged lumbosacral spondylosis. Phleboliths in the pelvis.           Assessment  · Mechanical Low Back Pain     724.2/M54.5  · Pain: Hip     719.45/M25.559  · Sacroiliac pain     724.6/M53.3      Plan  · Orders  o 2.00 - Dexamethasone Injection 8mg (-2) - 724.2/M54.5 - 08/10/2020   Lot [id number] Exp 02 2021 [facility] by ANANTH [facility]  o IM/SQ - Injection Fee [facility] ([id number]) - 724.2/M54.5 - 08/10/2020  · Medications  o Medications have been Reconciled  o Transition of Care or Provider Policy  · Instructions  o Reviewed the patient's Past Medical, Social, and Family history as well as the ROS at today's visit, no changes.  o Call or return if worsening symptoms.  o The above service was scribed by Brooklynn Sampson on my behalf and I attest to the accuracy of the note. colby  o Discussed she has mechanical back pain and we recommended conservative treatment with physical therapy, activity modifications, shoe lifts, etc. She expressed understanding and wished to proceed with IM steroid injection, continue with Naproxen prescription and therapy order.             Electronically Signed by: Brooklynn Sampson-, Other -Author on August 10, 2020 12:29:41 PM  Electronically Co-signed by: Otilio Winston MD -Reviewer on August 10, 2020 10:04:16  PM

## 2021-05-14 NOTE — PROGRESS NOTES
Progress Note      Patient Name: Lorena Thorne   Patient ID: [id number]   Sex: Female   YOB: 1955    Primary Care Provider: Reji Nam MD   Referring Provider: Reji Nam MD    Visit Date: May 4, 2021    Provider: DALE Gerard   Location: [facility]   Location Address: [address] [place] [address]  [id number]   Location Phone: [phone]          Chief Complaint  · ER f/u      History Of Present Illness     65-year-old female with a history of dysphagia and IBS returns after period of long absence.  She recently went to the ER for abdominal pain and diarrhea.  CT abdomen/pelvis with contrast 04/25/2021 revealed segmental colitis involving the cecum and through the distal transverse colon.  She was given a prescription of Flagyl and Cipro at the ER. She was having constant right lower quadrant pain that was described as cramping.  Activity would aggravate the pain, while laying will help alleviate the pain since taking the Cipro and Flagyl, her pain and bowels have improved.  She reports that since her pelvic reconstruction surgery December 31, 2020, her bowels have alternated between constipation and diarrhea.  She denies rectal bleeding and weight loss.  She does have occasional nausea, but she does not vomit.  She does report having melena, but is not taking any iron or Pepto-Bismol.  She will occasionally use ibuprofen and also takes a baby aspirin daily.  She does not smoke.  She has had some reflux recently, but is controlled with lifestyle modifications. Her colonoscopy in 2016 by Dr. Low revealed grade 1 internal hemorrhoids and diverticula in the sigmoid.  EGD 2018 by Dr. Low revealed gastritis.    Labs 04/25/2021: Hemoglobin 13.7, hematocrit 41.4, platelets 255, AST 15, ALT 14, total bili 0.27, alk phos 96, creatinine 0.9, GFR greater than 60       Past Medical History  Abdominal pain; Anxiety; Depression; Diverticulitis;  "Hemorrhoids; Hyperlipidemia; Hypertension; Left Carpal Tunnel Syndrome         Past Surgical History  Appendectomy; Cesarian Section; Cholecystectomy; Colonoscopy; Dilation and curretage; EGD; Hysterectomy         Medication List  aspirin 81 mg oral tablet,delayed release (DR/EC); bupropion HCl 100 mg oral tablet; estradiol 0.5 mg oral tablet; Fiber Con 625 mg; Flagyl 500 mg oral tablet; hyoscyamine sulfate 0.125 mg oral tablet,disintegrating; loratadine 10 mg oral capsule; losartan 50 mg oral tablet; melatonin 5 mg oral capsule; naproxen 500 mg oral tablet; Plenvu 140-9-5.2 gram oral powder in packet, sequential; simvastatin 20 mg Oral tablet; Vitamin B Complex Sublingual; Vitamin D3 2,000 unit oral capsule; Zofran 4 mg oral tablet         Allergy List  NO KNOWN DRUG ALLERGIES       Allergies Reconciled  Family Medical History  Cancer, Unspecified; - No Family History of Colorectal Cancer; Family history of Arthritis         Social History  Alcohol (Never); lives with spouse; ; Recreational Drug Use (Never); Retired; Tobacco (Never); Working         Review of Systems  · Constitutional  o Denies  o : chills, fever  · Cardiovascular  o Denies  o : chest pain  · Respiratory  o Denies  o : shortness of breath  · Gastrointestinal  o Admits  o : nausea  o Denies  o : vomiting, dysphagia  · Endocrine  o Denies  o : weight gain, weight loss      Vitals  Date Time BP Position Site L\R Cuff Size HR RR TEMP (F) WT  HT  BMI kg/m2 BSA m2 O2 Sat FR L/min FiO2 HC       05/04/2021 09:13 /67 Sitting    65 - R 12  171lbs 1oz 5'  6\" 27.61 1.9 98 %            Physical Examination  · Constitutional  o Appearance  o : Healthy-appearing, awake and alert in no acute distress  · Head and Face  o Head  o : Normocephalic with no worriesome skin lesions  · Respiratory  o Auscultation of Lungs  o : Chest is clear to auscultation bilaterally.  · Cardiovascular  o Heart  o : Cardiovascular exam reveals a regular rate and " rhythm.  o Peripheral Vascular System  o :   § Extremities  § : no cyanosis, clubbing or edema;   · Gastrointestinal  o Abdominal Examination  o : Abdomen is soft, nontender to palpation, with normal active bowel sounds, no appreciable hepatosplenomegaly.          Assessment  · Abdominal Pain, RLQ     789.03/R10.31  · Gastroesophageal Reflux     530.81/K21.9  · Melena     578.1/K92.1  · Abnormal CT of the abdomen     793.6/R93.5  · Colonic polyp     211.3/K63.5      Plan  · Orders  o Flexible Colonoscopy -Possible risks/complications, benefits, and alternatives to surgical or invasive procedure have been explained to patient and/or legal gaurdian. -Patient has been evaluated and can tolerate anethesia and/or sedation. Risk, benefits, and alternatives to anethesia and/or sedation have been explained to patient and/or legal gaurdian. (68145) - 793.6/R93.5, 211.3/K63.5, 789.03/R10.31 - 05/04/2021  o Consent for Esophagogastroduodenoscopy (EGD) - Possible risks/complications, benefits, and alternatives to surgical or invasive procedure have been explained to patient and/or legal guardian. - Patient has been evaluated and can tolerate anesthesia and/or sedation. Risks, benefits, and alternatives to anesthesia and sedation have been explained to patient and/or legal guardian. (21801) - 578.1/K92.1, 530.81/K21.9 - 05/04/2021  · Medications  o Medications have been Reconciled  o Transition of Care or Provider Policy  · Instructions  o 65-year-old female with a history of dysphagia and IBS returns after period of long absence. Her CT of the abdomen/pelvis revealed segmental colitis involving the cecum and through the distal transverse colon. She is to continue to take her Flagyl and Cipro. I have recommended she undergo an EGD/colonoscopy. We have discussed the procedures. The patient is agreeable to proceed with both procedures.  o Electronically Identified Patient Education Materials Provided  Electronically            Electronically Signed by: DALE Gerard -Author on May 4, 2021 12:15:19 PM  Electronically Co-signed by: Hernán Valerio MD -Reviewer on May 24, 2021 02:46:36 PM

## 2021-05-15 VITALS — BODY MASS INDEX: 26.52 KG/M2 | WEIGHT: 165 LBS | HEIGHT: 66 IN

## 2021-05-16 VITALS
OXYGEN SATURATION: 100 % | HEART RATE: 85 BPM | WEIGHT: 150 LBS | SYSTOLIC BLOOD PRESSURE: 107 MMHG | BODY MASS INDEX: 23.54 KG/M2 | RESPIRATION RATE: 12 BRPM | DIASTOLIC BLOOD PRESSURE: 73 MMHG | HEIGHT: 67 IN

## 2021-05-28 ENCOUNTER — HOSPITAL ENCOUNTER (OUTPATIENT)
Dept: GASTROENTEROLOGY | Facility: HOSPITAL | Age: 66
Setting detail: HOSPITAL OUTPATIENT SURGERY
Discharge: HOME | End: 2021-05-28
Attending: INTERNAL MEDICINE

## 2021-07-15 VITALS
HEART RATE: 65 BPM | RESPIRATION RATE: 12 BRPM | OXYGEN SATURATION: 98 % | DIASTOLIC BLOOD PRESSURE: 67 MMHG | BODY MASS INDEX: 27.49 KG/M2 | WEIGHT: 171.06 LBS | HEIGHT: 66 IN | SYSTOLIC BLOOD PRESSURE: 116 MMHG

## 2021-10-01 ENCOUNTER — OFFICE VISIT (OUTPATIENT)
Dept: OBSTETRICS AND GYNECOLOGY | Facility: CLINIC | Age: 66
End: 2021-10-01

## 2021-10-01 VITALS
BODY MASS INDEX: 28.12 KG/M2 | HEART RATE: 78 BPM | DIASTOLIC BLOOD PRESSURE: 62 MMHG | SYSTOLIC BLOOD PRESSURE: 138 MMHG | WEIGHT: 175 LBS | HEIGHT: 66 IN

## 2021-10-01 DIAGNOSIS — N39.3 STRESS INCONTINENCE OF URINE: ICD-10-CM

## 2021-10-01 DIAGNOSIS — Z13.820 OSTEOPOROSIS SCREENING: ICD-10-CM

## 2021-10-01 DIAGNOSIS — Z01.419 PAP TEST, AS PART OF ROUTINE GYNECOLOGICAL EXAMINATION: Primary | ICD-10-CM

## 2021-10-01 LAB
BILIRUB BLD-MCNC: NEGATIVE MG/DL
GLUCOSE UR STRIP-MCNC: NEGATIVE MG/DL
GLUCOSE UR STRIP-MCNC: NEGATIVE MG/DL
KETONES UR QL: NEGATIVE
LEUKOCYTE EST, POC: NEGATIVE
PROT UR STRIP-MCNC: NEGATIVE MG/DL
PROT UR STRIP-MCNC: NEGATIVE MG/DL
RBC # UR STRIP: NEGATIVE /UL

## 2021-10-01 PROCEDURE — 99396 PREV VISIT EST AGE 40-64: CPT | Performed by: NURSE PRACTITIONER

## 2021-10-01 PROCEDURE — 81002 URINALYSIS NONAUTO W/O SCOPE: CPT | Performed by: NURSE PRACTITIONER

## 2021-10-01 RX ORDER — ESTRADIOL 0.1 MG/G
1 CREAM VAGINAL 2 TIMES WEEKLY
Qty: 42 G | Refills: 5 | Status: SHIPPED | OUTPATIENT
Start: 2021-10-04 | End: 2022-10-04

## 2021-10-01 RX ORDER — ESTRADIOL 0.5 MG/1
0.5 TABLET ORAL DAILY
Qty: 90 TABLET | Refills: 3 | Status: SHIPPED | OUTPATIENT
Start: 2021-10-01 | End: 2022-11-29

## 2021-10-01 NOTE — PROGRESS NOTES
"  HPI:   65 y.o..Presents for well woman exam. Contraception or HRT: Oral and vaginal estrogen therapy for management menopause symptoms, desires rf  Menses:   S/P MARLO, BSO, No vaginal bleeding  Pain:  None  Last pap normal   Pt has increasing stress incontinence and suprapubic pressure, s/p bladder repair       Past Medical History:   Diagnosis Date   • Depression    • Dry eye    • Hyperlipidemia    • Hypertension    • IBS (irritable bowel syndrome)    • Uterine prolapse       Past Surgical History:   Procedure Laterality Date   •  SECTION     • CHOLECYSTECTOMY     • CYST REMOVAL Right     THUMB   • HYSTERECTOMY     • SACROCOLPOPEXY N/A 2020    Procedure: ROBOTIC LAPAROSCOPIC SACRAL COLPOPEXY, BILATERAL SALPINGO-OOPHORECTOMY, CYSTOSCOPY, POSTERIOR REPAIR, CYSTOTOMY REPAIR;  Surgeon: Filiberto Alberts MD;  Location: [facility];  Service: [facility];  Laterality: N/A;      Family History   Problem Relation Age of Onset   • Osteoporosis Mother    • Lung cancer Father    • Breast cancer Paternal Grandmother    • Malig Hyperthermia Neg Hx         PHYSICAL EXAM:  /62   Pulse 78   Ht 167.6 cm (66\")   Wt 79.4 kg (175 lb)   Breastfeeding No   BMI 28.25 kg/m²   General- NAD, alert and oriented, appropriate  Psych- Normal mood, good memory  Neck- No masses, no thyroid enlargement  Lymphatic- No palpable neck, axillary, or groin nodes  CV- Regular rhythm, no murnurs  Resp- CTA to bases, no wheezes  Abdomen- Soft, non distended, non tender, no masses  Breast left-  Bilaterally symmetrical, no masses, non tender, no nipple discharge  Breast right- Bilaterally symmetrical, no masses, non tender, no nipple discharge  External genitalia- Normal female, no lesions  Urethra/meatus- Normal, no masses, non tender, no prolapse  Bladder- Normal, no masses, non tender, no prolapse  Vagina- Normal, no atrophy, no lesions, no discharge, no prolapse  Cvx- Absent  Uterus- Absent  Adnexa- " Absent  Anus/Rectum/Perineum- Not performed, Declined  Ext- No edema, no cyanosis    Skin- No lesions, no rashes, no acanthosis nigricans      ASSESSMENT and PLAN:  WWE    Diagnoses and all orders for this visit:    1. Pap test, as part of routine gynecological examination (Primary)  -     POC Urinalysis Dipstick    2. Osteoporosis screening  -     DEXA Bone Density Axial; Future    3. Stress incontinence of urine  -     POC Urinalysis Dipstick  -     POC Urinalysis Dipstick    Other orders  -     estradiol (ESTRACE) 0.1 MG/GM vaginal cream; Insert 1 g into the vagina 2 (Two) Times a Week.  Dispense: 42 g; Refill: 5  -     estradiol (ESTRACE) 0.5 MG tablet; Take 1 tablet by mouth Daily.  Dispense: 90 tablet; Refill: 3      Brief Urine Lab Results  (Last result in the past 365 days)      Color   Clarity   Blood   Leuk Est   Nitrite   Protein       10/01/21 0956     Negative Negative Negative  Negative                Counseling:     HRT R/B/A/SE/E all options including non-FDA approved options reviewed  Continue HRT, calcium/vitamin d supplementation, weightbearing exercise    Preventative:   BREAST HEALTH- Monthly self breast exam importance and how to reviewed. MMG and/or MRI (prn) reviewed per society guidelines and her individual history. Screen: Already up to date.  CERVICAL CANCER SCREENING- Reviewed current ASCCP guidelines for screening w and wo cotest HPV, age specific.  Screen: Not medically needed.  COLON CANCER SCREENING- Reviewed current medical society guidelines and options.  Screen:  Already up to date.  BONE HEALTH- Reviewed current medical society guidelines and options for testing, calcium and vit D intake.  Weight bearing exercise.  DEXA: Updated today.  Follow up PCP/Specialist PMHx and Labs  Myriad: Does not qualify.  FU PCP for continued or worsening bladder discomfort.    She understands the importance of having any ordered tests to be performed in a timely fashion.  The risks of not performing  them include, but are not limited to, advanced cancer stages, bone loss from osteoporosis and/or subsequent increase in morbidity and/or mortality.  She is encouraged to review her results online and/or contact or office if she has questions.     Follow Up:  Return as needed.      Diana Manzo, APRN  10/01/2021

## 2021-10-12 ENCOUNTER — HOSPITAL ENCOUNTER (OUTPATIENT)
Dept: GENERAL RADIOLOGY | Facility: HOSPITAL | Age: 66
Discharge: HOME | End: 2021-10-12

## 2021-10-12 ENCOUNTER — TRANSCRIBE ORDERS (OUTPATIENT)
Dept: GENERAL RADIOLOGY | Facility: HOSPITAL | Age: 66
End: 2021-10-12

## 2021-10-12 DIAGNOSIS — M25.551 RIGHT HIP PAIN: ICD-10-CM

## 2021-10-12 DIAGNOSIS — M25.551 RIGHT HIP PAIN: Primary | ICD-10-CM

## 2021-10-12 PROCEDURE — 72120 X-RAY BEND ONLY L-S SPINE: CPT

## 2021-10-12 PROCEDURE — 73502 X-RAY EXAM HIP UNI 2-3 VIEWS: CPT

## 2022-01-21 ENCOUNTER — TELEPHONE (OUTPATIENT)
Dept: OBSTETRICS AND GYNECOLOGY | Facility: CLINIC | Age: 67
End: 2022-01-21

## 2022-01-21 DIAGNOSIS — Z78.0 MENOPAUSE: Primary | ICD-10-CM

## 2022-01-21 NOTE — TELEPHONE ENCOUNTER
Bonny from the [facility] called needing a corrected DEXA order on this patient. The diagnosis code that was used is not covered by Medicare. Her appointment in on Monday before Diana (original orderer) will be back in the office. Please sign the attached order.

## 2022-01-24 ENCOUNTER — HOSPITAL ENCOUNTER (OUTPATIENT)
Dept: BONE DENSITY | Facility: HOSPITAL | Age: 67
Discharge: HOME | End: 2022-01-24
Admitting: NURSE PRACTITIONER

## 2022-01-24 DIAGNOSIS — Z13.820 OSTEOPOROSIS SCREENING: ICD-10-CM

## 2022-01-24 PROCEDURE — 77080 DXA BONE DENSITY AXIAL: CPT

## 2022-01-27 ENCOUNTER — TELEPHONE (OUTPATIENT)
Dept: OBSTETRICS AND GYNECOLOGY | Facility: CLINIC | Age: 67
End: 2022-01-27

## 2022-01-27 NOTE — TELEPHONE ENCOUNTER
----- Message from DALE Hewitt sent at 1/26/2022  3:29 PM EST -----  Bone density scan reveals osteopenia in the lumbar spin and femur, recommend daily calcium 1200mg and vitamin d 1000IU supplementation, in addition, weight bearing exercise regularly such as walking to optimize bone health, avoid smoking, repeat bone density scan in 2 years.

## 2022-11-17 ENCOUNTER — TELEPHONE (OUTPATIENT)
Dept: OBSTETRICS AND GYNECOLOGY | Facility: CLINIC | Age: 67
End: 2022-11-17

## 2022-11-17 DIAGNOSIS — Z12.31 ENCOUNTER FOR SCREENING MAMMOGRAM FOR BREAST CANCER: Primary | ICD-10-CM

## 2022-11-17 NOTE — TELEPHONE ENCOUNTER
Left a detailed message for the patient with the number for mammogram scheduling. Please sign the attached order.

## 2022-11-17 NOTE — TELEPHONE ENCOUNTER
Caller: Lorena Thorne    Relationship: Self    Best call back number:[phone]    What orders are you requesting (i.e. lab or imaging): MAMMOGRAM    In what timeframe would the patient need to come in: PATIENT IS SCHEDULED FOR 11/29/22 FOR ANNUAL EXAM    Where will you receive your lab/imaging services: [facility]    Additional notes: PATIENT IS SCHEDULED WITH DALE BRUNO FOR ANNUAL EXAM ON 11/29/22 AND WOULD LIKE ORDER SENT OVER FOR MAMMOGRAM.

## 2022-11-23 ENCOUNTER — HOSPITAL ENCOUNTER (OUTPATIENT)
Dept: MAMMOGRAPHY | Facility: HOSPITAL | Age: 67
Discharge: HOME | End: 2022-11-23
Admitting: NURSE PRACTITIONER

## 2022-11-23 DIAGNOSIS — Z12.31 ENCOUNTER FOR SCREENING MAMMOGRAM FOR BREAST CANCER: ICD-10-CM

## 2022-11-23 PROCEDURE — 77067 SCR MAMMO BI INCL CAD: CPT

## 2022-11-23 PROCEDURE — 77063 BREAST TOMOSYNTHESIS BI: CPT

## 2022-11-29 ENCOUNTER — OFFICE VISIT (OUTPATIENT)
Dept: OBSTETRICS AND GYNECOLOGY | Facility: CLINIC | Age: 67
End: 2022-11-29

## 2022-11-29 VITALS
HEART RATE: 87 BPM | WEIGHT: 175 LBS | DIASTOLIC BLOOD PRESSURE: 75 MMHG | HEIGHT: 66 IN | SYSTOLIC BLOOD PRESSURE: 134 MMHG | BODY MASS INDEX: 28.12 KG/M2

## 2022-11-29 DIAGNOSIS — N89.8 VAGINAL ODOR: ICD-10-CM

## 2022-11-29 DIAGNOSIS — Z01.419 WELL WOMAN EXAM: Primary | ICD-10-CM

## 2022-11-29 DIAGNOSIS — N39.46 URINARY INCONTINENCE, MIXED: ICD-10-CM

## 2022-11-29 DIAGNOSIS — N95.2 VAGINAL ATROPHY: ICD-10-CM

## 2022-11-29 LAB
BILIRUB BLD-MCNC: NEGATIVE MG/DL
CANDIDA SPECIES: NEGATIVE
GARDNERELLA VAGINALIS: POSITIVE
GLUCOSE UR STRIP-MCNC: NEGATIVE MG/DL
KETONES UR QL: NEGATIVE
LEUKOCYTE EST, POC: ABNORMAL
NITRITE UR-MCNC: NEGATIVE MG/ML
PH UR: 5 [PH] (ref 5–8)
PROT UR STRIP-MCNC: ABNORMAL MG/DL
RBC # UR STRIP: NEGATIVE /UL
SP GR UR: 1.01 (ref 1–1.03)
T VAGINALIS DNA VAG QL PROBE+SIG AMP: NEGATIVE
UROBILINOGEN UR QL: NORMAL

## 2022-11-29 PROCEDURE — 99212 OFFICE O/P EST SF 10 MIN: CPT | Performed by: NURSE PRACTITIONER

## 2022-11-29 PROCEDURE — 87510 GARDNER VAG DNA DIR PROBE: CPT | Performed by: NURSE PRACTITIONER

## 2022-11-29 PROCEDURE — G0101 CA SCREEN;PELVIC/BREAST EXAM: HCPCS | Performed by: NURSE PRACTITIONER

## 2022-11-29 PROCEDURE — 87480 CANDIDA DNA DIR PROBE: CPT | Performed by: NURSE PRACTITIONER

## 2022-11-29 PROCEDURE — 81002 URINALYSIS NONAUTO W/O SCOPE: CPT | Performed by: NURSE PRACTITIONER

## 2022-11-29 PROCEDURE — 87660 TRICHOMONAS VAGIN DIR PROBE: CPT | Performed by: NURSE PRACTITIONER

## 2022-11-29 RX ORDER — ESTRADIOL 0.1 MG/G
1 CREAM VAGINAL 2 TIMES WEEKLY
Qty: 42.5 G | Refills: 3 | Status: SHIPPED | OUTPATIENT
Start: 2022-11-29 | End: 2023-11-29

## 2022-11-29 RX ORDER — MELATONIN 10 MG
CAPSULE ORAL
COMMUNITY

## 2022-11-29 RX ORDER — SIMVASTATIN 20 MG
TABLET ORAL
COMMUNITY
End: 2023-02-17

## 2022-11-29 RX ORDER — LOSARTAN POTASSIUM 50 MG/1
TABLET ORAL
COMMUNITY
End: 2023-02-17

## 2022-11-29 RX ORDER — BUPROPION HYDROCHLORIDE 100 MG/1
TABLET ORAL
COMMUNITY
End: 2023-02-17

## 2022-11-29 NOTE — PROGRESS NOTES
"  HPI:   67 y.o. . Presents for well woman exam. Contraception or HRT: Post menopausal, using HRT, Vaginal estrogen therapy, s/p HYST BSO, benign indications  Menses:   No vaginal bleeding  Pain:  None  Last pap: normal   Complaints: Experiencing increased urinary leakage without warning, wearing pad all the time,  and vaginal odor  Hx Sacrocolpopexy , feels like something has changed, prescribed vaginal estrogen therapy per Dr. Alberts and Vesicare- did not fill Vesicare    Past Medical History:   Diagnosis Date   • Anxiety    • Depression    • Dry eye    • Hyperlipidemia    • Hypertension    • IBS (irritable bowel syndrome)    • Uterine prolapse       Past Surgical History:   Procedure Laterality Date   •  SECTION     • CHOLECYSTECTOMY     • CYST REMOVAL Right     THUMB   • HYSTERECTOMY      secondary to uterine fibroids   • SACROCOLPOPEXY N/A 2020    Procedure: ROBOTIC LAPAROSCOPIC SACRAL COLPOPEXY, BILATERAL SALPINGO-OOPHORECTOMY, CYSTOSCOPY, POSTERIOR REPAIR, CYSTOTOMY REPAIR;  Surgeon: Filiberto Alberts MD;  Location: [facility];  Service: [facility];  Laterality: N/A;      Family History   Problem Relation Age of Onset   • Osteoporosis Mother    • Lung cancer Father    • Breast cancer Paternal Grandmother    • Malig Hyperthermia Neg Hx      Allergies as of 2022 - Reviewed 2022   Allergen Reaction Noted   • Amoxicillin Unknown - High Severity 2020   • Clavulanic acid GI Intolerance 2020        PCP: does manage PMHx and preventative labs    /75   Pulse 87   Ht 167.6 cm (66\")   Wt 79.4 kg (175 lb)   BMI 28.25 kg/m²     PHYSICAL EXAM: Chaperone present   General- NAD, alert and oriented, appropriate  Psych- Normal mood, good memory  Neck- No masses, no thyroid enlargement  Lymphatic- No palpable neck, axillary, or groin nodes  CV- Regular rhythm, no murmurs  Resp- CTA to bases, no wheezes  Abdomen- Soft, non distended, non tender, no masses  Breast " left-  Bilaterally symmetrical, no masses, non tender, no nipple discharge  Breast right- Bilaterally symmetrical, no masses, non tender, no nipple discharge  External genitalia- Normal female, no lesions  Urethra/meatus- Normal, no masses, non tender, no prolapse  Bladder- Normal, no masses, non tender, no prolapse  Vagina- Atrophic, no lesions, scant tan discharge, cystocele/rectocele grade 2, suture extending 1 cm from vaginal vault   Cvx- Absent  Uterus- Absent  Adnexa- Absent  Anus/Rectum/Perineum- Not performed  Ext- No edema, no cyanosis    Skin- No lesions, no rashes, no acanthosis nigricans       ASSESSMENT and PLAN:    Diagnoses and all orders for this visit:    1. Well woman exam (Primary)    2. Urinary incontinence, mixed  -     POC Urinalysis Dipstick    3. Vaginal atrophy  -     estradiol (ESTRACE) 0.1 MG/GM vaginal cream; Insert 1 g into the vagina 2 (Two) Times a Week.  Dispense: 42.5 g; Refill: 3    4. Vaginal odor  -     Gardnerella vaginalis, Trichomonas vaginalis, Candida albicans, DNA - Swab, Vagina      Appearance, UA   Date Value Ref Range Status   04/25/2021 CLOUDY (A) NA Final     Specific Gravity    Date Value Ref Range Status   11/29/2022 1.010 1.005 - 1.030 Final     pH, Urine   Date Value Ref Range Status   11/29/2022 5.0 5.0 - 8.0 Final     Leukocytes   Date Value Ref Range Status   11/29/2022 Trace (A) Negative Final     Nitrite, UA   Date Value Ref Range Status   11/29/2022 Negative Negative Final     Protein, POC   Date Value Ref Range Status   11/29/2022 Trace (A) Negative mg/dL Final     Glucose, UA   Date Value Ref Range Status   11/29/2022 Negative Negative mg/dL Final     Ketones, UA   Date Value Ref Range Status   11/29/2022 Negative Negative Final     Urobilinogen, UA   Date Value Ref Range Status   11/29/2022 Normal Normal, 0.2 E.U./dL Final     Bilirubin   Date Value Ref Range Status   11/29/2022 Negative Negative Final     Blood, UA   Date Value Ref Range Status    11/29/2022 Negative Negative Final      Preventative:   BREAST HEALTH- Monthly self breast exam importance and how to reviewed. MMG and/or MRI (prn) reviewed per society guidelines and her individual history. Screen: Already up to date  CERVICAL CANCER Screening- Reviewed current ASCCP guidelines for screening w and wo cotest HPV, age specific.  Screen: Not medically needed  COLON CANCER Screening- Reviewed current medical society guidelines and options.  Screen:  Already up to date  BONE HEALTH- Reviewed current medical society guidelines and options for testing, calcium and vit D intake.  Weight bearing exercise.  DEXA: Already up to date  VACCINATIONS Recommended: Pneumococcal (66yo and older).  Importance discussed, risk being unvaccinated reviewed.  Questions answered  Smoking status- NON SMOKER  Follow up PCP/Specialist PMHx and Labs  Myriad: Does not qualify.  HRT R/B/A/SE/E all options including non-FDA approved options discussed w pt.         HRT- I recommend the lowest dose possible, for the shortest period of time.  Risks HRT NLT breast CA (progestin), CVA, MI, JUSTIN.     Recommend continue vaginal estrogen therapy, start vesicare as prescribed by Dr. Alberts, Kegel exercises, pelvic floor rehab-declined at this time. FU with Dr. Alberts if symptoms persist or worsen    She understands the importance of having any ordered tests to be performed in a timely fashion.  The risks of not performing them include, but are not limited to, advanced cancer stages, bone loss from osteoporosis and/or subsequent increase in morbidity and/or mortality.  She is encouraged to review her results online and/or contact or office if she has questions.     Follow Up:  Return in about 1 year (around 11/29/2023).        Diana Manzo, DALE  11/29/2022

## 2022-11-30 RX ORDER — METRONIDAZOLE 7.5 MG/G
GEL VAGINAL
Qty: 70 G | Refills: 0 | Status: SHIPPED | OUTPATIENT
Start: 2022-11-30 | End: 2022-12-05

## 2023-02-03 ENCOUNTER — TELEPHONE (OUTPATIENT)
Dept: OBSTETRICS AND GYNECOLOGY | Facility: CLINIC | Age: 68
End: 2023-02-03
Payer: MEDICARE

## 2023-02-03 NOTE — TELEPHONE ENCOUNTER
Patient called stating she did go ahead and fill the Vesicare 5 mg after her visit with you. She has been doing well with that medication and would like you to send a prescription to the pharmacy on file. Please advise.

## 2023-02-06 RX ORDER — SOLIFENACIN SUCCINATE 5 MG/1
5 TABLET, FILM COATED ORAL DAILY
Qty: 90 TABLET | Refills: 3 | Status: ON HOLD | OUTPATIENT
Start: 2023-02-06 | End: 2023-03-15

## 2023-02-06 NOTE — TELEPHONE ENCOUNTER
Patient called back checking on the status of the Vesicare script being sent to her pharmacy. Please advise.

## 2023-02-16 NOTE — PROGRESS NOTES
Chief Complaint   Acid Reflux     History of Present Illness       Lorena Thorne is a 67 y.o. female who presents to [facility] for follow-up with a history of reflux.  Patient reports beginning after Radha that she started having epigastric burning and burning in her esophagus that was waking her up at night.  She reports lifestyle changes including eating early dinners before 6:00, sitting up at night on pillows, raising the bed by 4 inches and cutting back on chocolate and caffeine.  She reports a 12 pound weight gain over the past year and a half with a history of a hiatal hernia.  Patient did have carpal tunnel surgery last year and was on naproxen but has discontinued.  She does continue on a baby aspirin daily.  She denies family history of gastric cancer.    Endoscopy: Review of the patient's most recent EGD and colonoscopy performed by Dr. Valerio on 2021 normal stomach, duodenal, hiatal hernia, grade 1 esophagitis.  Internal hemorrhoids grade 1, diverticulosis.      Results       Result Review :                             Past Medical History       Past Medical History:   Diagnosis Date   • Anxiety    • Depression    • Diverticulitis of colon 10 years ago   • Dry eye    • GERD (gastroesophageal reflux disease) May 2021    Acid worsen    • Hyperlipidemia    • Hypertension    • IBS (irritable bowel syndrome)    • Uterine prolapse        Past Surgical History:   Procedure Laterality Date   •  SECTION     • CHOLECYSTECTOMY     • COLONOSCOPY  May 2021   • CYST REMOVAL Right     THUMB   • HYSTERECTOMY      secondary to uterine fibroids   • SACROCOLPOPEXY N/A 2020    Procedure: ROBOTIC LAPAROSCOPIC SACRAL COLPOPEXY, BILATERAL SALPINGO-OOPHORECTOMY, CYSTOSCOPY, POSTERIOR REPAIR, CYSTOTOMY REPAIR;  Surgeon: Filiberto Alberts MD;  Location: [facility];  Service: [facility];  Laterality: N/A;   • UPPER GASTROINTESTINAL ENDOSCOPY  May 2021  "        Current Outpatient Medications:   •  Artificial Tear Ointment (artificial tears) ophthalmic ointment, Administer  to both eyes Every 1 (One) Hour As Needed., Disp: , Rfl:   •  aspirin 81 MG EC tablet, Take 81 mg by mouth Daily. HELD, Disp: , Rfl:   •  buPROPion SR (WELLBUTRIN SR) 100 MG 12 hr tablet, Take 100 mg by mouth 2 (Two) Times a Day., Disp: , Rfl:   •  Calcium Carbonate 1500 (600 Ca) MG tablet, Calcium 600 600 mg (1,500 mg) oral tablet take 1 tablet by oral route daily   Suspended, Disp: , Rfl:   •  estradiol (ESTRACE) 0.1 MG/GM vaginal cream, Insert 1 g into the vagina 2 (Two) Times a Week., Disp: 42.5 g, Rfl: 3  •  losartan (COZAAR) 50 MG tablet, Take 50 mg by mouth Daily., Disp: , Rfl:   •  Melatonin 10 MG capsule, Take  by mouth., Disp: , Rfl:   •  multivitamin with minerals tablet tablet, Take 1 tablet by mouth Daily., Disp: , Rfl:   •  naproxen (NAPROSYN) 500 MG tablet, Take 500 mg by mouth 2 (Two) Times a Day With Meals. HELD, Disp: , Rfl:   •  simvastatin (ZOCOR) 20 MG tablet, Take 20 mg by mouth Every Night., Disp: , Rfl:   •  solifenacin (VESIcare) 5 MG tablet, Take 1 tablet by mouth Daily., Disp: 90 tablet, Rfl: 3  •  pantoprazole (PROTONIX) 40 MG EC tablet, Take 1 tablet by mouth Daily., Disp: 30 tablet, Rfl: 5     Allergies   Allergen Reactions   • Amoxicillin Unknown - High Severity   • Clavulanic Acid GI Intolerance     Other reaction(s): GI Intolerance       Family History   Problem Relation Age of Onset   • Osteoporosis Mother    • Lung cancer Father    • Breast cancer Paternal Grandmother    • Malig Hyperthermia Neg Hx    • Colon cancer Neg Hx         Social History     Social History Narrative   • Not on file       Objective     Vital Signs:   /71 (BP Location: Left arm, Patient Position: Sitting, Cuff Size: Adult)   Pulse 72   Ht 170.2 cm (67\")   Wt 83.5 kg (184 lb)   SpO2 99%   BMI 28.82 kg/m²       Physical Exam  Constitutional:       General: She is not in acute " distress.     Appearance: Normal appearance. She is well-developed and normal weight.   Eyes:      Conjunctiva/sclera: Conjunctivae normal.      Pupils: Pupils are equal, round, and reactive to light.      Visual Fields: Right eye visual fields normal and left eye visual fields normal.   Cardiovascular:      Rate and Rhythm: Normal rate and regular rhythm.      Heart sounds: Normal heart sounds.   Pulmonary:      Effort: Pulmonary effort is normal. No retractions.      Breath sounds: Normal breath sounds and air entry.      Comments: Inspection of chest: normal appearance  Abdominal:      General: Bowel sounds are normal.      Palpations: Abdomen is soft.      Tenderness: There is no abdominal tenderness.      Comments: No appreciable hepatosplenomegaly   Musculoskeletal:      Cervical back: Neck supple.      Right lower leg: No edema.      Left lower leg: No edema.   Lymphadenopathy:      Cervical: No cervical adenopathy.   Skin:     Findings: No lesion.      Comments: Turgor normal   Neurological:      Mental Status: She is alert and oriented to person, place, and time.   Psychiatric:         Mood and Affect: Mood and affect normal.           Assessment & Plan          Assessment and Plan    Diagnoses and all orders for this visit:    1. Gastroesophageal reflux disease, unspecified whether esophagitis present (Primary)    2. Nausea    3. Hiatal hernia    Other orders  -     pantoprazole (PROTONIX) 40 MG EC tablet; Take 1 tablet by mouth Daily.  Dispense: 30 tablet; Refill: 5      67-year-old female presenting the office today with a history of reflux, nausea and a hiatal hernia.  We will discontinue the patient's omeprazole 20 mg daily and start Protonix 40 mg daily.  I have recommended that the patient undergo further evaluation with an EGD.  I have discussed this procedure in detail with the patient.  I have discussed the risks, benefits and alternatives.  I have discussed the risk of anesthesia, bleeding and  perforation.  Patient understands these risks, benefits and alternatives and wishes to proceed.  I will schedule her at her earliest convenience.  I have also recommended changing her baby aspirin to v[place].  Patient to continue lifestyle changes related to reflux.  Patient agreeable to this plan will follow-up after endoscopy.            Follow Up       Follow Up   Return for Follow up after endoscopy in office.  Patient was given instructions and counseling regarding her condition or for health maintenance advice. Please see specific information pulled into the AVS if appropriate.

## 2023-02-17 ENCOUNTER — OFFICE VISIT (OUTPATIENT)
Dept: GASTROENTEROLOGY | Facility: CLINIC | Age: 68
End: 2023-02-17
Payer: MEDICARE

## 2023-02-17 ENCOUNTER — PREP FOR SURGERY (OUTPATIENT)
Dept: OTHER | Facility: HOSPITAL | Age: 68
End: 2023-02-17
Payer: MEDICARE

## 2023-02-17 VITALS
SYSTOLIC BLOOD PRESSURE: 140 MMHG | BODY MASS INDEX: 28.88 KG/M2 | HEIGHT: 67 IN | OXYGEN SATURATION: 99 % | HEART RATE: 72 BPM | WEIGHT: 184 LBS | DIASTOLIC BLOOD PRESSURE: 71 MMHG

## 2023-02-17 DIAGNOSIS — R11.0 NAUSEA: ICD-10-CM

## 2023-02-17 DIAGNOSIS — K44.9 HIATAL HERNIA: ICD-10-CM

## 2023-02-17 DIAGNOSIS — K21.9 GASTROESOPHAGEAL REFLUX DISEASE, UNSPECIFIED WHETHER ESOPHAGITIS PRESENT: Primary | ICD-10-CM

## 2023-02-17 PROCEDURE — 99214 OFFICE O/P EST MOD 30 MIN: CPT | Performed by: NURSE PRACTITIONER

## 2023-02-17 RX ORDER — PANTOPRAZOLE SODIUM 40 MG/1
40 TABLET, DELAYED RELEASE ORAL DAILY
Qty: 30 TABLET | Refills: 5 | Status: SHIPPED | OUTPATIENT
Start: 2023-02-17

## 2023-02-17 RX ORDER — MULTIPLE VITAMINS W/ MINERALS TAB 9MG-400MCG
1 TAB ORAL DAILY
COMMUNITY

## 2023-02-17 RX ORDER — OMEPRAZOLE 20 MG/1
20 CAPSULE, DELAYED RELEASE ORAL DAILY
COMMUNITY
End: 2023-02-17

## 2023-02-17 NOTE — PATIENT INSTRUCTIONS
Food Choices for Gastroesophageal Reflux Disease, Adult  When you have gastroesophageal reflux disease (GERD), the foods you eat and your eating habits are very important. Choosing the right foods can help ease your discomfort. Think about working with a food expert (dietitian) to help you make good choices.  What are tips for following this plan?  Reading food labels  Look for foods that are low in saturated fat. Foods that may help with your symptoms include:  Foods that have less than 5% of daily value (DV) of fat.  Foods that have 0 grams of trans fat.  Cooking  Do not merino your food.  Cook your food by baking, steaming, grilling, or broiling. These are all methods that do not need a lot of fat for cooking.  To add flavor, try to use herbs that are low in spice and acidity.  Meal planning    Choose healthy foods that are low in fat, such as:  Fruits and vegetables.  Whole grains.  Low-fat dairy products.  Lean meats, fish, and poultry.  Eat small meals often instead of eating 3 large meals each day. Eat your meals slowly in a place where you are relaxed. Avoid bending over or lying down until 2-3 hours after eating.  Limit high-fat foods such as fatty meats or fried foods.  Limit your intake of fatty foods, such as oils, butter, and shortening.  Avoid the following as told by your doctor:  Foods that cause symptoms. These may be different for different people. Keep a food diary to keep track of foods that cause symptoms.  Alcohol.  Drinking a lot of liquid with meals.  Eating meals during the 2-3 hours before bed.  Lifestyle  Stay at a healthy weight. Ask your doctor what weight is healthy for you. If you need to lose weight, work with your doctor to do so safely.  Exercise for at least 30 minutes on 5 or more days each week, or as told by your doctor.  Wear loose-fitting clothes.  Do not smoke or use any products that contain nicotine or tobacco. If you need help quitting, ask your doctor.  Sleep with the head  of your bed higher than your feet. Use a wedge under the mattress or blocks under the bed frame to raise the head of the bed.  Chew sugar-free gum after meals.  What foods should eat?  Eat a healthy, well-balanced diet of fruits, vegetables, whole grains, low-fat dairy products, lean meats, fish, and poultry. Each person is different.  Foods that may cause symptoms in one person may not cause any symptoms in another person. Work with your doctor to find foods that are safe for you.  The items listed above may not be a complete list of what you can eat and drink. Contact a food expert for more options.  What foods should I avoid?  Limiting some of these foods may help in managing the symptoms of GERD. Everyone is different. Talk with a food expert or your doctor to help you find the exact foods to avoid, if any.  Fruits  Any fruits prepared with added fat. Any fruits that cause symptoms. For some people, this may include citrus fruits, such as oranges, grapefruit, pineapple, and noelle.  Vegetables  Deep-fried vegetables. French fries. Any vegetables prepared with added fat. Any vegetables that cause symptoms. For some people, this may include tomatoes and tomato products, chili peppers, onions and garlic, and horseradish.  Grains  Pastries or quick breads with added fat.  Meats and other proteins  High-fat meats, such as fatty beef or pork, hot dogs, ribs, ham, sausage, salami, and carreon. Fried meat or protein, including fried fish and fried chicken. Nuts and nut butters, in large amounts.  Dairy  Whole milk and chocolate milk. Sour cream. Cream. Ice cream. Cream cheese. Milkshakes.  Fats and oils  Butter. Margarine. Shortening. Ghee.  Beverages  Coffee and tea, with or without caffeine. Carbonated beverages. Sodas. Energy drinks. Fruit juice made with acidic fruits, such as orange or grapefruit. Tomato juice. Alcoholic drinks.  Sweets and desserts  Chocolate and cocoa. Donuts.  Seasonings and condiments  Pepper.  Peppermint and spearmint. Added salt. Any condiments, herbs, or seasonings that cause symptoms. For some people, this may include eid, hot sauce, or vinegar-based salad dressings.  The items listed above may not be a complete list of what you should not eat and drink. Contact a food expert for more options.  Questions to ask your doctor  Diet and lifestyle changes are often the first steps that are taken to manage symptoms of GERD. If diet and lifestyle changes do not help, talk with your doctor about taking medicines.  Where to find more information  International [employer] for Gastrointestinal Disorders: aboutgerd.org  Summary  When you have GERD, food and lifestyle choices are very important in easing your symptoms.  Eat small meals often instead of 3 large meals a day. Eat your meals slowly and in a place where you are relaxed.  Avoid bending over or lying down until 2-3 hours after eating.  Limit high-fat foods such as fatty meats or fried foods.  This information is not intended to replace advice given to you by your health care provider. Make sure you discuss any questions you have with your health care provider.  Document Revised: 06/28/2021 Document Reviewed: 06/28/2021  Elsevier Patient Education © 2022 Elsevier Inc.

## 2023-02-21 ENCOUNTER — TELEPHONE (OUTPATIENT)
Dept: OBSTETRICS AND GYNECOLOGY | Facility: CLINIC | Age: 68
End: 2023-02-21
Payer: MEDICARE

## 2023-02-21 DIAGNOSIS — N32.81 OVERACTIVE BLADDER: Primary | ICD-10-CM

## 2023-02-21 RX ORDER — SOLIFENACIN SUCCINATE 5 MG/1
5 TABLET, FILM COATED ORAL DAILY
Qty: 90 TABLET | Refills: 3 | Status: SHIPPED | OUTPATIENT
Start: 2023-02-21

## 2023-02-21 NOTE — TELEPHONE ENCOUNTER
Okay'd refill on Vesicare thru 2/24.  Original Rx sent 2/6/23 Vesicare #90 with 3 refills to [facility] needs to go to [facility]

## 2023-03-01 ENCOUNTER — TELEPHONE (OUTPATIENT)
Dept: GASTROENTEROLOGY | Facility: CLINIC | Age: 68
End: 2023-03-01
Payer: MEDICARE

## 2023-03-01 NOTE — TELEPHONE ENCOUNTER
I spoke with Ms Thorne, confirmed her scheduled EGD on 03.15.23, estimated arrival time of 2:30pm. Reminded of NPO after midnight.  Stated a hospital nurse will call to go over Rx's and confirm time. Voiced understanding. heena

## 2023-03-13 RX ORDER — GUAIFENESIN 600 MG/1
1200 TABLET, EXTENDED RELEASE ORAL 2 TIMES DAILY
COMMUNITY

## 2023-03-15 ENCOUNTER — ANESTHESIA (OUTPATIENT)
Dept: GASTROENTEROLOGY | Facility: HOSPITAL | Age: 68
End: 2023-03-15
Payer: MEDICARE

## 2023-03-15 ENCOUNTER — ANESTHESIA EVENT (OUTPATIENT)
Dept: GASTROENTEROLOGY | Facility: HOSPITAL | Age: 68
End: 2023-03-15
Payer: MEDICARE

## 2023-03-15 ENCOUNTER — HOSPITAL ENCOUNTER (OUTPATIENT)
Facility: HOSPITAL | Age: 68
Setting detail: HOSPITAL OUTPATIENT SURGERY
Discharge: HOME | End: 2023-03-15
Attending: INTERNAL MEDICINE | Admitting: INTERNAL MEDICINE
Payer: MEDICARE

## 2023-03-15 VITALS
HEIGHT: 67 IN | DIASTOLIC BLOOD PRESSURE: 90 MMHG | BODY MASS INDEX: 28.72 KG/M2 | SYSTOLIC BLOOD PRESSURE: 125 MMHG | HEART RATE: 87 BPM | TEMPERATURE: 97.8 F | RESPIRATION RATE: 19 BRPM | OXYGEN SATURATION: 97 % | WEIGHT: 182.98 LBS

## 2023-03-15 DIAGNOSIS — R11.0 NAUSEA: ICD-10-CM

## 2023-03-15 DIAGNOSIS — K44.9 HIATAL HERNIA: ICD-10-CM

## 2023-03-15 DIAGNOSIS — K21.9 GASTROESOPHAGEAL REFLUX DISEASE, UNSPECIFIED WHETHER ESOPHAGITIS PRESENT: ICD-10-CM

## 2023-03-15 PROCEDURE — 25010000002 PROPOFOL 10 MG/ML EMULSION: Performed by: NURSE ANESTHETIST, CERTIFIED REGISTERED

## 2023-03-15 PROCEDURE — 88305 TISSUE EXAM BY PATHOLOGIST: CPT | Performed by: INTERNAL MEDICINE

## 2023-03-15 PROCEDURE — 43239 EGD BIOPSY SINGLE/MULTIPLE: CPT | Performed by: INTERNAL MEDICINE

## 2023-03-15 RX ORDER — LIDOCAINE HYDROCHLORIDE 20 MG/ML
INJECTION, SOLUTION EPIDURAL; INFILTRATION; INTRACAUDAL; PERINEURAL AS NEEDED
Status: DISCONTINUED | OUTPATIENT
Start: 2023-03-15 | End: 2023-03-15 | Stop reason: SURG

## 2023-03-15 RX ORDER — PROPOFOL 10 MG/ML
VIAL (ML) INTRAVENOUS AS NEEDED
Status: DISCONTINUED | OUTPATIENT
Start: 2023-03-15 | End: 2023-03-15 | Stop reason: SURG

## 2023-03-15 RX ORDER — EPHEDRINE SULFATE 50 MG/ML
INJECTION, SOLUTION INTRAVENOUS AS NEEDED
Status: DISCONTINUED | OUTPATIENT
Start: 2023-03-15 | End: 2023-03-15 | Stop reason: SURG

## 2023-03-15 RX ORDER — SODIUM CHLORIDE, SODIUM LACTATE, POTASSIUM CHLORIDE, CALCIUM CHLORIDE 600; 310; 30; 20 MG/100ML; MG/100ML; MG/100ML; MG/100ML
1000 INJECTION, SOLUTION INTRAVENOUS CONTINUOUS
Status: DISCONTINUED | OUTPATIENT
Start: 2023-03-15 | End: 2023-03-15 | Stop reason: HOSPADM

## 2023-03-15 RX ORDER — SODIUM CHLORIDE, SODIUM LACTATE, POTASSIUM CHLORIDE, CALCIUM CHLORIDE 600; 310; 30; 20 MG/100ML; MG/100ML; MG/100ML; MG/100ML
30 INJECTION, SOLUTION INTRAVENOUS CONTINUOUS
Status: DISCONTINUED | OUTPATIENT
Start: 2023-03-15 | End: 2023-03-15 | Stop reason: HOSPADM

## 2023-03-15 RX ORDER — PROPOFOL 10 MG/ML
VIAL (ML) INTRAVENOUS AS NEEDED
Status: DISCONTINUED | OUTPATIENT
Start: 2023-03-15 | End: 2023-03-15

## 2023-03-15 RX ADMIN — EPHEDRINE SULFATE 10 MG: 50 INJECTION INTRAVENOUS at 07:54

## 2023-03-15 RX ADMIN — PROPOFOL 200 MCG/KG/MIN: 10 INJECTION, EMULSION INTRAVENOUS at 07:43

## 2023-03-15 RX ADMIN — SODIUM CHLORIDE, POTASSIUM CHLORIDE, SODIUM LACTATE AND CALCIUM CHLORIDE 1000 ML: 600; 310; 30; 20 INJECTION, SOLUTION INTRAVENOUS at 06:41

## 2023-03-15 RX ADMIN — LIDOCAINE HYDROCHLORIDE 100 MG: 20 INJECTION, SOLUTION EPIDURAL; INFILTRATION; INTRACAUDAL; PERINEURAL at 07:43

## 2023-03-15 RX ADMIN — PROPOFOL 100 MG: 10 INJECTION, EMULSION INTRAVENOUS at 07:43

## 2023-03-15 NOTE — ANESTHESIA POSTPROCEDURE EVALUATION
Patient: Lorena Thorne    Procedure Summary     Date: 03/15/23 Room / Location: [facility] ENDOSCOPY 2 / [facility] ENDOSCOPY    Anesthesia Start: 0742 Anesthesia Stop: 0808    Procedure: ESOPHAGOGASTRODUODENOSCOPY WITH BIOPSIES Diagnosis:       Gastroesophageal reflux disease, unspecified whether esophagitis present      Nausea      Hiatal hernia      (Gastroesophageal reflux disease, unspecified whether esophagitis present [K21.9])      (Nausea [R11.0])      (Hiatal hernia [K44.9])    Surgeons: Hernán Valerio MD Provider: Akash Mann MD    Anesthesia Type: general ASA Status: 2          Anesthesia Type: general    Vitals  Vitals Value Taken Time   /90 03/15/23 0823   Temp 36.6 °C (97.8 °F) 03/15/23 0823   Pulse 87 03/15/23 0823   Resp 19 03/15/23 0823   SpO2 97 % 03/15/23 0823           Post Anesthesia Care and Evaluation    Patient location during evaluation: bedside  Patient participation: complete - patient participated  Level of consciousness: awake  Pain management: adequate    Airway patency: patent  Anesthetic complications: No anesthetic complications  PONV Status: none  Cardiovascular status: acceptable and stable  Respiratory status: acceptable  Hydration status: acceptable    Comments: An Anesthesiologist personally participated in the most demanding procedures (including induction and emergence if applicable) in the anesthesia plan, monitored the course of anesthesia administration at frequent intervals and remained physically present and available for immediate diagnosis and treatment of emergencies.

## 2023-03-15 NOTE — ANESTHESIA PREPROCEDURE EVALUATION
Anesthesia Evaluation     Patient summary reviewed and Nursing notes reviewed   no history of anesthetic complications:  NPO Solid Status: > 8 hours  NPO Liquid Status: > 2 hours           Airway   Mallampati: II  TM distance: >3 FB  Neck ROM: full  No difficulty expected  Dental      Pulmonary - negative pulmonary ROS and normal exam    breath sounds clear to auscultation  Cardiovascular - normal exam  Exercise tolerance: good (4-7 METS)    Rhythm: regular  Rate: normal    (+) hypertension,       Neuro/Psych- negative ROS  GI/Hepatic/Renal/Endo    (+)  GERD well controlled,      Musculoskeletal (-) negative ROS    Abdominal    Substance History - negative use     OB/GYN negative ob/gyn ROS         Other - negative ROS       ROS/Med Hx Other: PAT Nursing Notes unavailable.                   Anesthesia Plan    ASA 2     general       Anesthetic plan, risks, benefits, and alternatives have been provided, discussed and informed consent has been obtained with: patient.        CODE STATUS:

## 2023-03-17 LAB
CYTO UR: NORMAL
LAB AP CASE REPORT: NORMAL
LAB AP CLINICAL INFORMATION: NORMAL
PATH REPORT.FINAL DX SPEC: NORMAL
PATH REPORT.GROSS SPEC: NORMAL

## 2023-05-24 NOTE — PROGRESS NOTES
Chief Complaint   EGD follow up     History of Present Illness       Lorena Thorne is a 67 y.o. female who presents to [facility] for follow-up after recent EGD.  We reviewed EGD and pathology at this time.  Patient has a history of reflux, nausea and hiatal hernia.  At the last office visit we changed her omeprazole to Protonix.  Patient reports good improvement in her reflux with use of Protonix.  She does occasionally have a nighttime cough and is concerned if this is related to her allergies or reflux.  She does use intermittent Pepcid as needed.  Patient reports that her oldest daughter was recently diagnosed with colon cancer and she recently lost her  so she has been under increased stress lately.  Patient denies fever, nausea, vomiting, weight loss, night sweats, melena, hematochezia, hematemesis.    EGD: Review of the patient's most recent EGD performed by Dr. Valerio on 03/15/2023 small hiatal hernia no mucosa of the esophagus normal duodenum.    Endoscopy: Review of the patient's most recent EGD and colonoscopy performed by Dr. Valerio on 2021 normal stomach, duodenal, hiatal hernia, grade 1 esophagitis.  Internal hemorrhoids grade 1, diverticulosis.      Results       Result Review :                             Past Medical History       Past Medical History:   Diagnosis Date   • Anxiety    • Depression    • Diverticulitis of colon 10 years ago   • Dry eye    • GERD (gastroesophageal reflux disease) May 2021    Acid worsen    • Hyperlipidemia    • Hypertension    • IBS (irritable bowel syndrome)    • Uterine prolapse        Past Surgical History:   Procedure Laterality Date   • BLADDER EXTROPHY RECONSTRUCTION PELVIC SAGITTAL OSTEOTOMY     •  SECTION     • CHOLECYSTECTOMY     • COLONOSCOPY  May 2021   • CYST REMOVAL Right     THUMB   • ENDOSCOPY N/A 03/15/2023    Procedure: ESOPHAGOGASTRODUODENOSCOPY WITH BIOPSIES;  Surgeon: Hernán Valerio  MD Evans;  Location:  [facility];  Service: Gastroenterology;  Laterality: N/A;  SMALL HIATAL HERNIA   • HYSTERECTOMY  1990    secondary to uterine fibroids   • SACROCOLPOPEXY N/A 12/31/2020    Procedure: ROBOTIC LAPAROSCOPIC SACRAL COLPOPEXY, BILATERAL SALPINGO-OOPHORECTOMY, CYSTOSCOPY, POSTERIOR REPAIR, CYSTOTOMY REPAIR;  Surgeon: Filiberto Alberts MD;  Location: [facility];  Service: [facility];  Laterality: N/A;   • UPPER GASTROINTESTINAL ENDOSCOPY  May 2021         Current Outpatient Medications:   •  Artificial Tear Ointment (artificial tears) ophthalmic ointment, Administer  to both eyes Every 1 (One) Hour As Needed., Disp: , Rfl:   •  aspirin 81 MG EC tablet, Take 1 tablet by mouth Daily. HELD, Disp: , Rfl:   •  buPROPion SR (WELLBUTRIN SR) 100 MG 12 hr tablet, Take 1 tablet by mouth 2 (Two) Times a Day., Disp: , Rfl:   •  busPIRone (BUSPAR) 7.5 MG tablet, , Disp: , Rfl:   •  Calcium Carbonate 1500 (600 Ca) MG tablet, Calcium 600 600 mg (1,500 mg) oral tablet take 1 tablet by oral route daily   Suspended, Disp: , Rfl:   •  cyclobenzaprine (FLEXERIL) 10 MG tablet, , Disp: , Rfl:   •  estradiol (ESTRACE) 0.1 MG/GM vaginal cream, Insert 1 g into the vagina 2 (Two) Times a Week., Disp: 42.5 g, Rfl: 3  •  guaiFENesin (MUCINEX) 600 MG 12 hr tablet, Take 2 tablets by mouth 2 (Two) Times a Day., Disp: , Rfl:   •  losartan (COZAAR) 50 MG tablet, Take 1 tablet by mouth Daily., Disp: , Rfl:   •  Melatonin 10 MG capsule, Take  by mouth., Disp: , Rfl:   •  multivitamin with minerals tablet tablet, Take 1 tablet by mouth Daily., Disp: , Rfl:   •  pantoprazole (PROTONIX) 40 MG EC tablet, Take 1 tablet by mouth Daily., Disp: 30 tablet, Rfl: 5  •  simvastatin (ZOCOR) 20 MG tablet, Take 1 tablet by mouth Every Night., Disp: , Rfl:   •  solifenacin (VESIcare) 5 MG tablet, Take 1 tablet by mouth Daily., Disp: 90 tablet, Rfl: 3     Allergies   Allergen Reactions   • Amoxicillin Unknown - High Severity   • Clavulanic Acid  "GI Intolerance     Other reaction(s): GI Intolerance       Family History   Problem Relation Age of Onset   • Osteoporosis Mother    • Lung cancer Father    • Breast cancer Paternal Grandmother    • Colon cancer Daughter    • Malig Hyperthermia Neg Hx         Social History     Social History Narrative   • Not on file       Objective     Vital Signs:   /71 (BP Location: Right arm, Patient Position: Sitting, Cuff Size: Adult)   Pulse 73   Ht 167.6 cm (66\")   Wt 83 kg (183 lb)   SpO2 98%   BMI 29.54 kg/m²       Physical Exam  Constitutional:       General: She is not in acute distress.     Appearance: Normal appearance. She is well-developed and normal weight.   Eyes:      Conjunctiva/sclera: Conjunctivae normal.      Pupils: Pupils are equal, round, and reactive to light.      Visual Fields: Right eye visual fields normal and left eye visual fields normal.   Cardiovascular:      Rate and Rhythm: Normal rate and regular rhythm.      Heart sounds: Normal heart sounds.   Pulmonary:      Effort: Pulmonary effort is normal. No retractions.      Breath sounds: Normal breath sounds and air entry.      Comments: Inspection of chest: normal appearance  Abdominal:      General: Bowel sounds are normal.      Palpations: Abdomen is soft.      Tenderness: There is no abdominal tenderness.      Comments: No appreciable hepatosplenomegaly   Musculoskeletal:      Cervical back: Neck supple.      Right lower leg: No edema.      Left lower leg: No edema.   Lymphadenopathy:      Cervical: No cervical adenopathy.   Skin:     Findings: No lesion.      Comments: Turgor normal   Neurological:      Mental Status: She is alert and oriented to person, place, and time.   Psychiatric:         Mood and Affect: Mood and affect normal.           Assessment & Plan          Assessment and Plan    Diagnoses and all orders for this visit:    1. Gastroesophageal reflux disease without esophagitis (Primary)    2. Hiatal hernia    Other " orders  -     pantoprazole (PROTONIX) 40 MG EC tablet; Take 1 tablet by mouth Daily.  Dispense: 30 tablet; Refill: 5      67-year-old female presenting the office today for follow-up after recent EGD.  We reviewed EGD and pathology at this time.  Patient has a history of reflux, nausea and hiatal hernia.  I have refilled the patient's Protonix as this is working well for her reflux.  Patient will use Pepcid as needed for breakthrough reflux.  With patient's daughter recently being diagnosed with colon cancer we will move her care To a 3-year recall.  Patient will follow-up in the office in 1 year.  Patient agreeable to this plan will call with any questions or concerns          Follow Up       Follow Up   Return in about 1 year (around 5/30/2024).  Patient was given instructions and counseling regarding her condition or for health maintenance advice. Please see specific information pulled into the AVS if appropriate.

## 2023-05-30 ENCOUNTER — OFFICE VISIT (OUTPATIENT)
Dept: GASTROENTEROLOGY | Facility: CLINIC | Age: 68
End: 2023-05-30

## 2023-05-30 VITALS
BODY MASS INDEX: 29.41 KG/M2 | HEART RATE: 73 BPM | WEIGHT: 183 LBS | DIASTOLIC BLOOD PRESSURE: 71 MMHG | OXYGEN SATURATION: 98 % | SYSTOLIC BLOOD PRESSURE: 132 MMHG | HEIGHT: 66 IN

## 2023-05-30 DIAGNOSIS — K44.9 HIATAL HERNIA: ICD-10-CM

## 2023-05-30 DIAGNOSIS — K21.9 GASTROESOPHAGEAL REFLUX DISEASE WITHOUT ESOPHAGITIS: Primary | ICD-10-CM

## 2023-05-30 DIAGNOSIS — N32.81 OVERACTIVE BLADDER: ICD-10-CM

## 2023-05-30 PROCEDURE — 1160F RVW MEDS BY RX/DR IN RCRD: CPT | Performed by: NURSE PRACTITIONER

## 2023-05-30 PROCEDURE — 1159F MED LIST DOCD IN RCRD: CPT | Performed by: NURSE PRACTITIONER

## 2023-05-30 PROCEDURE — 99213 OFFICE O/P EST LOW 20 MIN: CPT | Performed by: NURSE PRACTITIONER

## 2023-05-30 RX ORDER — SOLIFENACIN SUCCINATE 5 MG/1
5 TABLET, FILM COATED ORAL DAILY
Qty: 90 TABLET | Refills: 1 | Status: SHIPPED | OUTPATIENT
Start: 2023-05-30

## 2023-05-30 RX ORDER — PANTOPRAZOLE SODIUM 40 MG/1
40 TABLET, DELAYED RELEASE ORAL DAILY
Qty: 30 TABLET | Refills: 5 | Status: SHIPPED | OUTPATIENT
Start: 2023-05-30

## 2023-05-30 RX ORDER — CYCLOBENZAPRINE HCL 10 MG
TABLET ORAL
COMMUNITY
Start: 2023-05-23

## 2023-05-30 RX ORDER — BUSPIRONE HYDROCHLORIDE 7.5 MG/1
TABLET ORAL
COMMUNITY
Start: 2023-05-23

## 2023-05-30 NOTE — TELEPHONE ENCOUNTER
[facility] pharmacy. Okay'd Vesicare # 90 with 1 refills. Last seen 11/29/22.  Next appointment 12/11/23

## 2023-05-30 NOTE — TELEPHONE ENCOUNTER
"  Caller: Lorena Thorne \"Libia\"    Relationship: Self    Best call back number: [phone]    Requested Prescriptions:   Requested Prescriptions     Pending Prescriptions Disp Refills   • solifenacin (VESIcare) 5 MG tablet 90 tablet 3     Sig: Take 1 tablet by mouth Daily.        Pharmacy where request should be sent:    WOULD LIKE TO SENT TO THE [facility] ON FILE    Last office visit with prescribing clinician: 11/29/2022   Last telemedicine visit with prescribing clinician: Visit date not found   Next office visit with prescribing clinician: 12/11/2023     Does the patient have less than a 3 day supply:  [x] Yes  [] No    Would you like a call back once the refill request has been completed: [x] Yes [] No    If the office needs to give you a call back, can they leave a voicemail: [x] Yes [] No    "

## 2023-10-09 ENCOUNTER — TRANSCRIBE ORDERS (OUTPATIENT)
Dept: ADMINISTRATIVE | Facility: HOSPITAL | Age: 68
End: 2023-10-09
Payer: MEDICARE

## 2023-10-09 DIAGNOSIS — Z12.31 SCREENING MAMMOGRAM FOR BREAST CANCER: Primary | ICD-10-CM

## 2023-11-20 RX ORDER — PANTOPRAZOLE SODIUM 40 MG/1
40 TABLET, DELAYED RELEASE ORAL DAILY
Qty: 30 TABLET | Refills: 5 | Status: SHIPPED | OUTPATIENT
Start: 2023-11-20

## 2023-12-11 ENCOUNTER — OFFICE VISIT (OUTPATIENT)
Dept: OBSTETRICS AND GYNECOLOGY | Facility: CLINIC | Age: 68
End: 2023-12-11
Payer: MEDICARE

## 2023-12-11 VITALS
HEIGHT: 66 IN | WEIGHT: 184 LBS | HEART RATE: 67 BPM | DIASTOLIC BLOOD PRESSURE: 79 MMHG | SYSTOLIC BLOOD PRESSURE: 170 MMHG | BODY MASS INDEX: 29.57 KG/M2

## 2023-12-11 DIAGNOSIS — I10 ELEVATED BLOOD PRESSURE READING IN OFFICE WITH DIAGNOSIS OF HYPERTENSION: ICD-10-CM

## 2023-12-11 DIAGNOSIS — N32.81 OVERACTIVE BLADDER: ICD-10-CM

## 2023-12-11 DIAGNOSIS — N95.2 VAGINAL ATROPHY: ICD-10-CM

## 2023-12-11 DIAGNOSIS — Z01.419 WELL WOMAN EXAM: Primary | ICD-10-CM

## 2023-12-11 PROCEDURE — 1159F MED LIST DOCD IN RCRD: CPT | Performed by: NURSE PRACTITIONER

## 2023-12-11 PROCEDURE — 99213 OFFICE O/P EST LOW 20 MIN: CPT | Performed by: NURSE PRACTITIONER

## 2023-12-11 PROCEDURE — 99397 PER PM REEVAL EST PAT 65+ YR: CPT | Performed by: NURSE PRACTITIONER

## 2023-12-11 PROCEDURE — 1160F RVW MEDS BY RX/DR IN RCRD: CPT | Performed by: NURSE PRACTITIONER

## 2023-12-11 RX ORDER — ESTRADIOL 0.1 MG/G
1 CREAM VAGINAL 2 TIMES WEEKLY
Qty: 42.5 G | Refills: 3 | Status: SHIPPED | OUTPATIENT
Start: 2023-12-11

## 2023-12-11 RX ORDER — SOLIFENACIN SUCCINATE 5 MG/1
5 TABLET, FILM COATED ORAL DAILY
Qty: 90 TABLET | Refills: 3 | Status: SHIPPED | OUTPATIENT
Start: 2023-12-11

## 2023-12-11 RX ORDER — NAPROXEN 500 MG/1
TABLET ORAL
COMMUNITY
Start: 2023-11-07

## 2023-12-11 NOTE — PROGRESS NOTES
HPI:   68 y.o. . Presents for well woman exam. Post menopausal, using HRT, vaginal estrogen therapy, desires to continue  Menses:  none  Pain:  None  Last pap: normal   Patient reports that she is currently experiencing occasional urinary leakage with laughing, wears incontinence pads. Hx of bladder reconstruction. Vesicare helps, no pain or visible blood with urination     Past Medical History:   Diagnosis Date    Anxiety     Depression     Diverticulitis of colon 10 years ago    Dry eye     Fibroid     HAD HYST    GERD (gastroesophageal reflux disease) May 2021    Acid worsen     Hyperlipidemia     Hypertension     IBS (irritable bowel syndrome)     Uterine prolapse       Past Surgical History:   Procedure Laterality Date    BLADDER EXTROPHY RECONSTRUCTION PELVIC SAGITTAL OSTEOTOMY       SECTION      CHOLECYSTECTOMY      COLONOSCOPY  May 2021    CYST REMOVAL Right     THUMB    ENDOSCOPY N/A 03/15/2023    Procedure: ESOPHAGOGASTRODUODENOSCOPY WITH BIOPSIES;  Surgeon: Hernán Valerio MD;  Location: [facility] ENDOSCOPY;  Service: Gastroenterology;  Laterality: N/A;  SMALL HIATAL HERNIA    HYSTERECTOMY      secondary to uterine fibroids/PROLAPSE, HEAVY BLEEDING    SACROCOLPOPEXY N/A 2020    Procedure: ROBOTIC LAPAROSCOPIC SACRAL COLPOPEXY, BILATERAL SALPINGO-OOPHORECTOMY, CYSTOSCOPY, POSTERIOR REPAIR, CYSTOTOMY REPAIR;  Surgeon: Filiberto Alberts MD;  Location: [facility];  Service: [facility];  Laterality: N/A;    UPPER GASTROINTESTINAL ENDOSCOPY  May 2021      Family History   Problem Relation Age of Onset    Lung cancer Father     Osteoporosis Mother     Colon cancer Daughter     Breast cancer Paternal Grandmother     Malig Hyperthermia Neg Hx     Ovarian cancer Neg Hx     Uterine cancer Neg Hx     Prostate cancer Neg Hx      Allergies as of 2023 - Reviewed 2023   Allergen Reaction Noted    Amoxicillin Unknown - High Severity 2020    Clavulanic acid GI  "Intolerance 12/28/2020        PCP: does manage PMHx and preventative labs    /79   Pulse 67   Ht 167.6 cm (66\")   Wt 83.5 kg (184 lb)   BMI 29.70 kg/m²     PHYSICAL EXAM: Chaperone present   General- NAD, alert and oriented, appropriate  Psych- Normal mood, good memory  Neck- No masses, no thyroid enlargement  Lymphatic- No palpable neck, axillary, or groin nodes  CV- Regular rhythm, no murmurs  Resp- CTA to bases, no wheezes  Abdomen- Soft, non distended, non tender, no masses  Breast left-  Bilaterally symmetrical, no masses, non tender, no nipple discharge  Breast right- Bilaterally symmetrical, no masses, non tender, no nipple discharge  External genitalia- Normal female, no lesions  Urethra/meatus- Normal, no masses, non tender, no prolapse  Bladder- Normal, no masses, non tender, no prolapse  Vagina- Normal, moderate atrophy, no lesions, no discharge, no prolapse  Cvx- Absent  Uterus- Absent  Adnexa- Absent  Anus/Rectum/Perineum- Not performed  Ext- No edema, no cyanosis    Skin- No lesions, no rashes, no acanthosis nigricans    ASSESSMENT and PLAN:    Diagnoses and all orders for this visit:    1. Well woman exam (Primary)    2. Overactive bladder  -     solifenacin (VESIcare) 5 MG tablet; Take 1 tablet by mouth Daily.  Dispense: 90 tablet; Refill: 3    3. Vaginal atrophy  -     estradiol (ESTRACE) 0.1 MG/GM vaginal cream; Insert 1 applicator into the vagina 2 (Two) Times a Week.  Dispense: 42.5 g; Refill: 3    4. Elevated blood pressure reading in office with diagnosis of hypertension      Preventative:   BREAST HEALTH- Monthly self breast exam importance and how to reviewed. MMG and/or MRI (prn) reviewed per society guidelines and her individual history. Screening Imaging: scheduled  CERVICAL CANCER Screening- Reviewed current ASCCP guidelines for screening w and wo cotest HPV, age specific.  Screen: Not medically needed  COLON CANCER Screening- Reviewed current medical society guidelines and " options.  Screen:  Already up to date  SEXUAL HEALTH: Declines STD screening  BONE HEALTH- Reviewed current medical society guidelines and options for testing, calcium and vit D intake.  Weight bearing exercise.  DEXA: Already up to date  VACCINATIONS Recommended: Flu annually, Pneumococcal (64yo and older)  Importance discussed, risk being unvaccinated reviewed.  Questions answered  Genetic testing: Does not qualify.  Smoking status- NON SMOKER  Follow up PCP/Specialist PMHx and Labs  HRT R/B/A/SE/E all options including non-FDA approved options discussed w pt.          She understands the importance of having any ordered tests to be performed in a timely fashion.  The risks of not performing them include, but are not limited to, advanced cancer stages, bone loss from osteoporosis and/or subsequent increase in morbidity and/or mortality.  She is encouraged to review her results online and/or contact or office if she has questions.   Elevated blood pressure, hx hypertension, recently taken off bp meds due to cough, recommend fu with pcp for further evaluation of high blood pressure. Counseled regarding cardiovascular risk associated with uncontrolled hypertension    Follow Up:  Return in about 1 year (around 12/11/2024).        Diana Manzo, APRN  12/11/2023

## 2023-12-19 ENCOUNTER — HOSPITAL ENCOUNTER (OUTPATIENT)
Dept: MAMMOGRAPHY | Facility: HOSPITAL | Age: 68
Discharge: HOME | End: 2023-12-19
Admitting: NURSE PRACTITIONER
Payer: MEDICARE

## 2023-12-19 DIAGNOSIS — Z12.31 SCREENING MAMMOGRAM FOR BREAST CANCER: ICD-10-CM

## 2023-12-19 PROCEDURE — 77067 SCR MAMMO BI INCL CAD: CPT

## 2023-12-19 PROCEDURE — 77063 BREAST TOMOSYNTHESIS BI: CPT

## 2024-07-08 RX ORDER — PANTOPRAZOLE SODIUM 40 MG/1
40 TABLET, DELAYED RELEASE ORAL DAILY
Qty: 30 TABLET | Refills: 5 | OUTPATIENT
Start: 2024-07-08

## 2024-07-09 ENCOUNTER — PREP FOR SURGERY (OUTPATIENT)
Dept: OTHER | Facility: HOSPITAL | Age: 69
End: 2024-07-09
Payer: MEDICARE

## 2024-07-09 ENCOUNTER — OFFICE VISIT (OUTPATIENT)
Dept: GASTROENTEROLOGY | Facility: CLINIC | Age: 69
End: 2024-07-09

## 2024-07-09 VITALS
BODY MASS INDEX: 28.56 KG/M2 | SYSTOLIC BLOOD PRESSURE: 139 MMHG | DIASTOLIC BLOOD PRESSURE: 74 MMHG | OXYGEN SATURATION: 96 % | HEART RATE: 80 BPM | HEIGHT: 67 IN | WEIGHT: 182 LBS

## 2024-07-09 DIAGNOSIS — Z86.010 HISTORY OF COLON POLYPS: ICD-10-CM

## 2024-07-09 DIAGNOSIS — Z80.0 FAMILY HISTORY OF COLON CANCER: ICD-10-CM

## 2024-07-09 DIAGNOSIS — K21.9 GASTROESOPHAGEAL REFLUX DISEASE WITHOUT ESOPHAGITIS: Primary | ICD-10-CM

## 2024-07-09 DIAGNOSIS — K44.9 HIATAL HERNIA: ICD-10-CM

## 2024-07-09 DIAGNOSIS — R05.3 CHRONIC COUGH: ICD-10-CM

## 2024-07-09 DIAGNOSIS — Z80.0 FAMILY HISTORY OF COLON CANCER: Primary | ICD-10-CM

## 2024-07-09 PROCEDURE — 1160F RVW MEDS BY RX/DR IN RCRD: CPT | Performed by: INTERNAL MEDICINE

## 2024-07-09 PROCEDURE — 99214 OFFICE O/P EST MOD 30 MIN: CPT | Performed by: INTERNAL MEDICINE

## 2024-07-09 PROCEDURE — 1159F MED LIST DOCD IN RCRD: CPT | Performed by: INTERNAL MEDICINE

## 2024-07-09 RX ORDER — CYCLOSPORINE 0.5 MG/ML
1 EMULSION OPHTHALMIC 2 TIMES DAILY
COMMUNITY
Start: 2024-05-16

## 2024-07-09 RX ORDER — AMLODIPINE BESYLATE 5 MG/1
TABLET ORAL
COMMUNITY
Start: 2024-05-06

## 2024-07-09 NOTE — PROGRESS NOTES
Chief Complaint    Lorena Thorne is a 68 y.o. female who presents to [facility] GASTROENTEROLOGY for follow-up with a long history of esophageal reflux but now with complaints of a chronic cough ongoing for at least 6 months or more.  She currently takes Protonix 40 mg in the morning and Pepcid 20 mg p.o. twice daily throughout the day.  Her reflux symptoms are relatively well-controlled however frequently at night she will be awakened by coughing.  She is never been a smoker although her  smoked in the house for many years until his death from lung cancer recently.  She inquires about possible allergies contributing to her symptoms that she does take antihistamine for sinus drainage.  She denies any weight loss, dysphagia.  Her last upper endoscopy was reviewed from 2023 showing a very small hiatal hernia and otherwise normal.  Her daughter was recently diagnosed with rectal cancer and underwent surgical resection with colostomy in [place].  Last colonoscopy was May 2021.    Result Review :     The following data was reviewed by: Hernán Valerio MD on 2024:             Past Medical History:   Diagnosis Date    Anxiety     Depression     Diverticulitis of colon 10 years ago    Dry eye     Fibroid     HAD HYST    GERD (gastroesophageal reflux disease) May 2021    Acid worsen     Hyperlipidemia     Hypertension     IBS (irritable bowel syndrome)     Uterine prolapse        Past Surgical History:   Procedure Laterality Date    BELPHAROPTOSIS REPAIR      BLADDER EXTROPHY RECONSTRUCTION PELVIC SAGITTAL OSTEOTOMY      CATARACT EXTRACTION       SECTION      CHOLECYSTECTOMY      COLONOSCOPY  May 2021    CYST REMOVAL Right     THUMB    ENDOSCOPY N/A 03/15/2023    Procedure: ESOPHAGOGASTRODUODENOSCOPY WITH BIOPSIES;  Surgeon: Hernán Valerio MD;  Location: [facility] ENDOSCOPY;  Service: Gastroenterology;  Laterality: N/A;  SMALL HIATAL HERNIA    HYSTERECTOMY    "   secondary to uterine fibroids/PROLAPSE, HEAVY BLEEDING    SACROCOLPOPEXY N/A 12/31/2020    Procedure: ROBOTIC LAPAROSCOPIC SACRAL COLPOPEXY, BILATERAL SALPINGO-OOPHORECTOMY, CYSTOSCOPY, POSTERIOR REPAIR, CYSTOTOMY REPAIR;  Surgeon: Filiberto Alberts MD;  Location: [facility];  Service: [facility];  Laterality: N/A;    UPPER GASTROINTESTINAL ENDOSCOPY  May 2021       Social History     Social History Narrative    Not on file       Objective     Vital Signs:   /74 (BP Location: Left arm, Patient Position: Sitting, Cuff Size: Adult)   Pulse 80   Ht 170.2 cm (67\")   Wt 82.6 kg (182 lb)   SpO2 96%   BMI 28.51 kg/m²     Body mass index is 28.51 kg/m².    Physical Exam            Assessment and Plan    Diagnoses and all orders for this visit:    1. Gastroesophageal reflux disease without esophagitis (Primary)  -     XR Chest 2 View; Future    2. Hiatal hernia    3. Chronic cough    4. Family history of colon cancer    Patient has developed a chronic cough usually worse at nighttime of uncertain etiology.  Although reflux is a possible cause it seems less likely given the very small hiatal hernia that she has but also her good reflux control with Protonix in the morning and Pepcid 20 mg twice daily.  I recommend she take her Pepcid 40 mg at bedtime to see if this will improve her nighttime cough.  I will schedule her for a chest x-ray given her exposure to smoking in the house from her .  I also recommended she have allergy testing performed and she knows an allergist in [place] and will arrange on her own.  Finally we will schedule her for repeat colonoscopy given her family history now and her last was over 3 years ago.  If her cough persist despite allergy workup and maximal reflux control pulmonary evaluation would be a consideration as well.              Follow Up   No follow-ups on file.  Patient was given instructions and counseling regarding her condition or for health maintenance advice. " Please see specific information pulled into the AVS if appropriate.

## 2024-07-10 PROBLEM — Z86.0100 HISTORY OF COLON POLYPS: Status: ACTIVE | Noted: 2024-07-09

## 2024-07-10 PROBLEM — Z86.010 HISTORY OF COLON POLYPS: Status: ACTIVE | Noted: 2024-07-09

## 2024-07-11 ENCOUNTER — HOSPITAL ENCOUNTER (OUTPATIENT)
Dept: GENERAL RADIOLOGY | Facility: HOSPITAL | Age: 69
Discharge: HOME | End: 2024-07-11
Admitting: INTERNAL MEDICINE
Payer: MEDICARE

## 2024-07-11 DIAGNOSIS — K21.9 GASTROESOPHAGEAL REFLUX DISEASE WITHOUT ESOPHAGITIS: ICD-10-CM

## 2024-07-11 PROCEDURE — 71046 X-RAY EXAM CHEST 2 VIEWS: CPT

## 2024-08-01 RX ORDER — PANTOPRAZOLE SODIUM 40 MG/1
40 TABLET, DELAYED RELEASE ORAL DAILY
Qty: 30 TABLET | Refills: 5 | Status: SHIPPED | OUTPATIENT
Start: 2024-08-01

## 2024-09-26 ENCOUNTER — ANESTHESIA EVENT (OUTPATIENT)
Dept: GASTROENTEROLOGY | Facility: HOSPITAL | Age: 69
End: 2024-09-26
Payer: MEDICARE

## 2024-09-26 NOTE — ANESTHESIA PREPROCEDURE EVALUATION
Anesthesia Evaluation     Patient summary reviewed and Nursing notes reviewed   NPO Solid Status: > 8 hours  NPO Liquid Status: > 4 hours           Airway   Mallampati: II  TM distance: >3 FB  Neck ROM: full  No difficulty expected  Dental - normal exam     Pulmonary - normal exam    breath sounds clear to auscultation  Cardiovascular - normal exam  Exercise tolerance: good (4-7 METS)    ECG reviewed  Rhythm: regular  Rate: normal    (+) hypertension well controlled, hyperlipidemia      Neuro/Psych  (+) psychiatric history Anxiety and Depression  GI/Hepatic/Renal/Endo    (+) hiatal hernia, GERD well controlled    Musculoskeletal     Abdominal    Substance History      OB/GYN          Other        ROS/Med Hx Other: Hx polyps, fam hx colon CA     NORMAL ECG -  Sinus rhythm  NO PRIOR TRACING AVAILABLE FOR COMPARISON  Electronically Signed By: Humberto Garner ([facility]) 28-Dec-2020 11:11:07  Date and Time of Study: 2020-12-28 10:34:48                  Anesthesia Plan    ASA 2     general   total IV anesthesia  (Total IV Anesthesia    Patient understands anesthesia not responsible for dental damage.  )  intravenous induction     Anesthetic plan, risks, benefits, and alternatives have been provided, discussed and informed consent has been obtained with: patient.  Pre-procedure education provided  Plan discussed with CRNA.      CODE STATUS:

## 2024-09-30 ENCOUNTER — ANESTHESIA (OUTPATIENT)
Dept: GASTROENTEROLOGY | Facility: HOSPITAL | Age: 69
End: 2024-09-30
Payer: MEDICARE

## 2024-09-30 ENCOUNTER — HOSPITAL ENCOUNTER (OUTPATIENT)
Facility: HOSPITAL | Age: 69
Setting detail: HOSPITAL OUTPATIENT SURGERY
Discharge: HOME | End: 2024-09-30
Attending: INTERNAL MEDICINE | Admitting: INTERNAL MEDICINE
Payer: MEDICARE

## 2024-09-30 VITALS
OXYGEN SATURATION: 94 % | RESPIRATION RATE: 24 BRPM | HEIGHT: 67 IN | WEIGHT: 183.42 LBS | HEART RATE: 79 BPM | DIASTOLIC BLOOD PRESSURE: 81 MMHG | TEMPERATURE: 98 F | BODY MASS INDEX: 28.79 KG/M2 | SYSTOLIC BLOOD PRESSURE: 113 MMHG

## 2024-09-30 PROCEDURE — 25010000002 PROPOFOL 10 MG/ML EMULSION: Performed by: NURSE ANESTHETIST, CERTIFIED REGISTERED

## 2024-09-30 PROCEDURE — 25810000003 LACTATED RINGERS PER 1000 ML: Performed by: NURSE ANESTHETIST, CERTIFIED REGISTERED

## 2024-09-30 RX ORDER — LIDOCAINE HYDROCHLORIDE 20 MG/ML
INJECTION, SOLUTION EPIDURAL; INFILTRATION; INTRACAUDAL; PERINEURAL AS NEEDED
Status: DISCONTINUED | OUTPATIENT
Start: 2024-09-30 | End: 2024-09-30 | Stop reason: SURG

## 2024-09-30 RX ORDER — SODIUM CHLORIDE, SODIUM LACTATE, POTASSIUM CHLORIDE, CALCIUM CHLORIDE 600; 310; 30; 20 MG/100ML; MG/100ML; MG/100ML; MG/100ML
30 INJECTION, SOLUTION INTRAVENOUS CONTINUOUS
Status: DISCONTINUED | OUTPATIENT
Start: 2024-09-30 | End: 2024-09-30 | Stop reason: HOSPADM

## 2024-09-30 RX ORDER — PROPOFOL 10 MG/ML
VIAL (ML) INTRAVENOUS AS NEEDED
Status: DISCONTINUED | OUTPATIENT
Start: 2024-09-30 | End: 2024-09-30 | Stop reason: SURG

## 2024-09-30 RX ADMIN — SODIUM CHLORIDE, POTASSIUM CHLORIDE, SODIUM LACTATE AND CALCIUM CHLORIDE 30 ML/HR: 600; 310; 30; 20 INJECTION, SOLUTION INTRAVENOUS at 07:06

## 2024-09-30 RX ADMIN — LIDOCAINE HYDROCHLORIDE 100 MG: 20 INJECTION, SOLUTION EPIDURAL; INFILTRATION; INTRACAUDAL; PERINEURAL at 08:14

## 2024-09-30 RX ADMIN — PROPOFOL 100 MG: 10 INJECTION, EMULSION INTRAVENOUS at 08:14

## 2024-09-30 RX ADMIN — PROPOFOL 250 MCG/KG/MIN: 10 INJECTION, EMULSION INTRAVENOUS at 08:14

## 2024-09-30 NOTE — H&P
ScreeningPre Procedure History & Physical    Chief Complaint:   Screening     Subjective     HPI:   Screening     Past Medical History:   Past Medical History:   Diagnosis Date    Anxiety     Depression     Diverticulitis of colon 10 years ago    Dry eye     Fibroid     HAD HYST    GERD (gastroesophageal reflux disease) May 2021    Acid worsen     Hyperlipidemia     Hypertension     IBS (irritable bowel syndrome)     Uterine prolapse        Past Surgical History:  Past Surgical History:   Procedure Laterality Date    BELPHAROPTOSIS REPAIR      BLADDER EXTROPHY RECONSTRUCTION PELVIC SAGITTAL OSTEOTOMY      CATARACT EXTRACTION       SECTION      CHOLECYSTECTOMY      COLONOSCOPY  May 2021    CYST REMOVAL Right     THUMB    ENDOSCOPY N/A 03/15/2023    Procedure: ESOPHAGOGASTRODUODENOSCOPY WITH BIOPSIES;  Surgeon: Hernán Valerio MD;  Location: [facility] ENDOSCOPY;  Service: Gastroenterology;  Laterality: N/A;  SMALL HIATAL HERNIA    HYSTERECTOMY      secondary to uterine fibroids/PROLAPSE, HEAVY BLEEDING    SACROCOLPOPEXY N/A 2020    Procedure: ROBOTIC LAPAROSCOPIC SACRAL COLPOPEXY, BILATERAL SALPINGO-OOPHORECTOMY, CYSTOSCOPY, POSTERIOR REPAIR, CYSTOTOMY REPAIR;  Surgeon: Filiberto Alberts MD;  Location: [facility];  Service: [facility];  Laterality: N/A;    UPPER GASTROINTESTINAL ENDOSCOPY  May 2021       Family History:  Family History   Problem Relation Age of Onset    Lung cancer Father     Osteoporosis Mother     Colon cancer Daughter     Breast cancer Paternal Grandmother     Malig Hyperthermia Neg Hx     Ovarian cancer Neg Hx     Uterine cancer Neg Hx     Prostate cancer Neg Hx        Social History:   reports that she has never smoked. She has been exposed to tobacco smoke. She has never used smokeless tobacco. She reports that she does not drink alcohol and does not use drugs.    Medications:   Medications Prior to Admission   Medication Sig Dispense Refill Last Dose    amLODIPine  "(NORVASC) 5 MG tablet    Past Week    aspirin 81 MG EC tablet Take 1 tablet by mouth Daily. HELD   Past Week    buPROPion SR (WELLBUTRIN SR) 100 MG 12 hr tablet Take 1 tablet by mouth 2 (Two) Times a Day.   Past Week    Calcium Carbonate 1500 (600 Ca) MG tablet Calcium 600 600 mg (1,500 mg) oral tablet take 1 tablet by oral route daily   Suspended   Past Week    Melatonin 10 MG capsule Take  by mouth.   Past Week    multivitamin with minerals tablet tablet Take 1 tablet by mouth Daily.   Past Week    pantoprazole (PROTONIX) 40 MG EC tablet TAKE 1 TABLET BY MOUTH DAILY 30 tablet 5 Past Week    simvastatin (ZOCOR) 20 MG tablet Take 1 tablet by mouth Every Night.   Past Week    solifenacin (VESIcare) 5 MG tablet Take 1 tablet by mouth Daily. 90 tablet 3 Past Week    estradiol (ESTRACE) 0.1 MG/GM vaginal cream Insert 1 applicator into the vagina 2 (Two) Times a Week. 42.5 g 3     naproxen (NAPROSYN) 500 MG tablet    More than a month    Restasis 0.05 % ophthalmic emulsion Administer 1 drop to both eyes 2 (Two) Times a Day.          Allergies:  Amoxicillin and Clavulanic acid        Objective     Blood pressure 144/80, pulse 82, temperature 97.6 °F (36.4 °C), temperature source Temporal, resp. rate 17, height 170.2 cm (67\"), weight 83.2 kg (183 lb 6.8 oz), SpO2 95%, not currently breastfeeding.    Physical Exam   Constitutional: Pt is oriented to person, place, and time and well-developed, well-nourished, and in no distress.   Mouth/Throat: Oropharynx is clear and moist.   Neck: Normal range of motion.   Cardiovascular: Normal rate, regular rhythm and normal heart sounds.    Pulmonary/Chest: Effort normal and breath sounds normal.   Abdominal: Soft. Nontender  Skin: Skin is warm and dry.   Psychiatric: Mood, memory, affect and judgment normal.     Assessment & Plan     Diagnosis:  Screening colonoscopy  H/o colon polyps  Family h/o colorectal cancer     Anticipated Surgical Procedure:  colonoscopy    The risks, benefits, " and alternatives of this procedure have been discussed with the patient or the responsible party- the patient understands and agrees to proceed.

## 2024-11-25 ENCOUNTER — TELEPHONE (OUTPATIENT)
Dept: OBSTETRICS AND GYNECOLOGY | Facility: CLINIC | Age: 69
End: 2024-11-25
Payer: MEDICARE

## 2024-11-25 NOTE — TELEPHONE ENCOUNTER
"Caller: Lorena Thorne \"Libia\"    Relationship to patient: Self    Best call back number: [phone]    Type of visit: MAMMOGRAM    Requested date: ANYTIME AFTER 12/18/24     Additional notes:PATIENT IS WANTING TO SCHEDULE MAMMOGRAM. ANYTIME AFTER 12/18/24 AND WOULD PREFER AFTERNOON APPOINTMENT.       "

## 2024-12-04 ENCOUNTER — TRANSCRIBE ORDERS (OUTPATIENT)
Dept: ADMINISTRATIVE | Facility: HOSPITAL | Age: 69
End: 2024-12-04
Payer: MEDICARE

## 2024-12-04 DIAGNOSIS — Z12.31 VISIT FOR SCREENING MAMMOGRAM: Primary | ICD-10-CM

## 2024-12-12 NOTE — PROGRESS NOTES
Chief Complaint   Patient presents with    Annual Exam       HPI:   69 y.o. . Presents for well woman exam. Post menopausal, using HRT, vaginal estrogen cream, has improved dryness and irritation, desires refills, s/p HYST BSO, benign indications  Menses: none  Pain:  None  Last pap: no longer medically indicated   Complaints: Pt has no complaints today.    Patient reports that she is not currently experiencing any symptoms of urinary incontinence.       Past Medical History:   Diagnosis Date    Anxiety     Depression     Diverticulitis of colon 10 years ago    Dry eye     Fibroid     HAD HYST    GERD (gastroesophageal reflux disease) May 2021    Acid worsen     Hyperlipidemia     Hypertension     IBS (irritable bowel syndrome)     Uterine prolapse       Past Surgical History:   Procedure Laterality Date    BELPHAROPTOSIS REPAIR      BLADDER EXTROPHY RECONSTRUCTION PELVIC SAGITTAL OSTEOTOMY      CATARACT EXTRACTION       SECTION      CHOLECYSTECTOMY      COLONOSCOPY  May 2021    COLONOSCOPY N/A 2024    Procedure: COLONOSCOPY;  Surgeon: Hernán Valerio MD;  Location: [facility] ENDOSCOPY;  Service: Gastroenterology;  Laterality: N/A;  DIVERTICULOSIS    CYST REMOVAL Right     THUMB    ENDOSCOPY N/A 03/15/2023    Procedure: ESOPHAGOGASTRODUODENOSCOPY WITH BIOPSIES;  Surgeon: Hernán Valerio MD;  Location: [facility] ENDOSCOPY;  Service: Gastroenterology;  Laterality: N/A;  SMALL HIATAL HERNIA    HYSTERECTOMY      secondary to uterine fibroids/PROLAPSE, HEAVY BLEEDING    SACROCOLPOPEXY N/A 2020    Procedure: ROBOTIC LAPAROSCOPIC SACRAL COLPOPEXY, BILATERAL SALPINGO-OOPHORECTOMY, CYSTOSCOPY, POSTERIOR REPAIR, CYSTOTOMY REPAIR;  Surgeon: Filiberto Alberts MD;  Location: [facility];  Service: [facility];  Laterality: N/A;    UPPER GASTROINTESTINAL ENDOSCOPY  May 2021      Family History   Problem Relation Age of Onset    Lung cancer Father     Osteoporosis Mother     Colon cancer  "Daughter     Breast cancer Paternal Grandmother     Saurav Hyperthermia Neg Hx     Ovarian cancer Neg Hx     Uterine cancer Neg Hx     Prostate cancer Neg Hx      Allergies as of 12/17/2024 - Reviewed 12/17/2024   Allergen Reaction Noted    Amoxicillin Unknown - High Severity 09/01/2020    Clavulanic acid GI Intolerance 12/28/2020        PCP: does manage PMHx and preventative labs    /80   Pulse 78   Ht 170.2 cm (67\")   Wt 79.8 kg (176 lb)   Breastfeeding No   BMI 27.57 kg/m²     PHYSICAL EXAM: Chaperone present   General- NAD, alert and oriented, appropriate  Psych- Normal mood, good memory  Neck- No masses, no thyroid enlargement  Lymphatic- No palpable neck, axillary, or groin nodes  CV- Regular rhythm, no murmurs  Resp- CTA to bases, no wheezes  Abdomen- Soft, non distended, non tender, no masses  Breast left-  Bilaterally symmetrical, no masses, non tender, no nipple discharge  Breast right- Bilaterally symmetrical, no masses, non tender, no nipple discharge  External genitalia- Normal female, no lesions  Urethra/meatus- Normal, no masses, non tender, no prolapse  Bladder- Normal, no masses, non tender, no prolapse  Vagina- Mild atrophy, no lesions, no discharge, no prolapse  Cvx- Absent  Uterus- Absent  Adnexa- No mass, non tender  Anus/Rectum/Perineum- Not performed  Ext- No edema, no cyanosis    Skin- No lesions, no rashes, no acanthosis nigricans      ASSESSMENT and PLAN:    Diagnoses and all orders for this visit:    1. Well woman exam (Primary)    2. Vaginal atrophy  -     estradiol (ESTRACE) 0.1 MG/GM vaginal cream; Insert 1 g into the vagina 2 (Two) Times a Week.  Dispense: 42.5 g; Refill: 4    3. Menopause  -     DEXA Bone Density Axial; Future    4. Screening for osteoporosis  -     DEXA Bone Density Axial; Future        Preventative:   BREAST HEALTH- Monthly self breast exam importance and how to reviewed. MMG and/or MRI (prn) reviewed per society guidelines and her individual history. " Screening Imaging: scheduled  CERVICAL CANCER Screening- Reviewed current ASCCP guidelines for screening w and wo cotest HPV, age specific.  Screen: Not medically needed  COLON CANCER Screening- Reviewed current medical society guidelines and options.  Screen:  Already up to date  SEXUAL HEALTH: Declines STD screening  BONE HEALTH- Reviewed current medical society guidelines and options for testing, calcium and vit D intake.  Weight bearing exercise.  DEXA: Updated today  VACCINATIONS Recommended: COVID and booster PRN, Flu annually  Importance discussed, risk being unvaccinated reviewed.  Questions answered  Genetic testing: Does not qualify.  Smoking status- NON SMOKER  Follow up PCP/Specialist PMHx and Labs  HRT R/B/A/SE/E all options including non-FDA approved options discussed w pt.          She understands the importance of having any ordered tests to be performed in a timely fashion.  The risks of not performing them include, but are not limited to, advanced cancer stages, bone loss from osteoporosis and/or subsequent increase in morbidity and/or mortality.  She is encouraged to review her results online and/or contact or office if she has questions.       Follow Up:  Return in about 1 year (around 12/17/2025).        Diana Manzo, APRN  12/17/2024

## 2024-12-17 ENCOUNTER — OFFICE VISIT (OUTPATIENT)
Dept: OBSTETRICS AND GYNECOLOGY | Facility: CLINIC | Age: 69
End: 2024-12-17
Payer: MEDICARE

## 2024-12-17 VITALS
BODY MASS INDEX: 27.62 KG/M2 | WEIGHT: 176 LBS | DIASTOLIC BLOOD PRESSURE: 80 MMHG | SYSTOLIC BLOOD PRESSURE: 138 MMHG | HEIGHT: 67 IN | HEART RATE: 78 BPM

## 2024-12-17 DIAGNOSIS — Z78.0 MENOPAUSE: ICD-10-CM

## 2024-12-17 DIAGNOSIS — N95.2 VAGINAL ATROPHY: ICD-10-CM

## 2024-12-17 DIAGNOSIS — Z01.419 WELL WOMAN EXAM: Primary | ICD-10-CM

## 2024-12-17 DIAGNOSIS — Z13.820 SCREENING FOR OSTEOPOROSIS: ICD-10-CM

## 2024-12-17 RX ORDER — MONTELUKAST SODIUM 10 MG/1
10 TABLET ORAL
COMMUNITY
Start: 2024-08-30

## 2024-12-17 RX ORDER — HYDROCHLOROTHIAZIDE 12.5 MG/1
12.5 TABLET ORAL
COMMUNITY
Start: 2024-09-26

## 2024-12-17 RX ORDER — ESTRADIOL 0.1 MG/G
1 CREAM VAGINAL 2 TIMES WEEKLY
Qty: 42.5 G | Refills: 4 | Status: SHIPPED | OUTPATIENT
Start: 2024-12-19

## 2024-12-17 RX ORDER — ALBUTEROL SULFATE 90 UG/1
INHALANT RESPIRATORY (INHALATION)
COMMUNITY
Start: 2024-12-12

## 2024-12-17 RX ORDER — FLUTICASONE PROPIONATE AND SALMETEROL 250; 50 UG/1; UG/1
POWDER RESPIRATORY (INHALATION)
COMMUNITY
Start: 2024-12-08

## 2024-12-28 ENCOUNTER — HOSPITAL ENCOUNTER (OUTPATIENT)
Dept: MAMMOGRAPHY | Facility: HOSPITAL | Age: 69
Discharge: HOME | End: 2024-12-28
Admitting: NURSE PRACTITIONER
Payer: MEDICARE

## 2024-12-28 DIAGNOSIS — Z12.31 VISIT FOR SCREENING MAMMOGRAM: ICD-10-CM

## 2024-12-28 PROCEDURE — 77067 SCR MAMMO BI INCL CAD: CPT

## 2024-12-28 PROCEDURE — 77063 BREAST TOMOSYNTHESIS BI: CPT

## 2025-01-03 ENCOUNTER — HOSPITAL ENCOUNTER (OUTPATIENT)
Dept: BONE DENSITY | Facility: HOSPITAL | Age: 70
Discharge: HOME | End: 2025-01-03
Payer: MEDICARE

## 2025-01-03 DIAGNOSIS — Z13.820 SCREENING FOR OSTEOPOROSIS: ICD-10-CM

## 2025-01-03 DIAGNOSIS — Z78.0 MENOPAUSE: ICD-10-CM

## 2025-01-03 PROCEDURE — 77080 DXA BONE DENSITY AXIAL: CPT

## 2025-01-08 ENCOUNTER — TELEPHONE (OUTPATIENT)
Dept: OBSTETRICS AND GYNECOLOGY | Facility: CLINIC | Age: 70
End: 2025-01-08
Payer: MEDICARE

## 2025-01-08 NOTE — TELEPHONE ENCOUNTER
Attempted to contact patient, to go over results/recommendations from DEXA scan, unable to leave voicemail, phone just rang.

## 2025-01-08 NOTE — TELEPHONE ENCOUNTER
----- Message from Diana Manzo sent at 1/6/2025  9:10 AM EST -----  Osteopenia, reduced bone density detected: recommend daily calcium 1200mg/vitamin d3 800 IU daily supplement, regular weight bearing exercise such as walking, avoid smoking. Recommend repeat bone density scan in 2 years.

## 2025-01-30 RX ORDER — PANTOPRAZOLE SODIUM 40 MG/1
40 TABLET, DELAYED RELEASE ORAL DAILY
Qty: 30 TABLET | Refills: 5 | Status: SHIPPED | OUTPATIENT
Start: 2025-01-30

## 2025-03-24 ENCOUNTER — TRANSCRIBE ORDERS (OUTPATIENT)
Dept: GENERAL RADIOLOGY | Facility: HOSPITAL | Age: 70
End: 2025-03-24
Payer: MEDICARE

## 2025-03-24 ENCOUNTER — HOSPITAL ENCOUNTER (OUTPATIENT)
Dept: GENERAL RADIOLOGY | Facility: HOSPITAL | Age: 70
Discharge: HOME | End: 2025-03-24
Admitting: INTERNAL MEDICINE
Payer: MEDICARE

## 2025-03-24 DIAGNOSIS — R05.9 COUGH, UNSPECIFIED TYPE: Primary | ICD-10-CM

## 2025-03-24 DIAGNOSIS — R05.9 COUGH, UNSPECIFIED TYPE: ICD-10-CM

## 2025-03-24 PROCEDURE — 71046 X-RAY EXAM CHEST 2 VIEWS: CPT

## 2025-04-17 ENCOUNTER — TELEPHONE (OUTPATIENT)
Dept: GASTROENTEROLOGY | Facility: CLINIC | Age: 70
End: 2025-04-17
Payer: MEDICARE

## 2025-04-17 DIAGNOSIS — K57.92 DIVERTICULITIS: Primary | ICD-10-CM

## 2025-04-17 RX ORDER — CIPROFLOXACIN 500 MG/1
500 TABLET, FILM COATED ORAL 2 TIMES DAILY
Qty: 20 TABLET | Refills: 0 | Status: SHIPPED | OUTPATIENT
Start: 2025-04-17 | End: 2025-04-27

## 2025-04-17 RX ORDER — METRONIDAZOLE 500 MG/1
500 TABLET ORAL 3 TIMES DAILY
Qty: 30 TABLET | Refills: 0 | Status: SHIPPED | OUTPATIENT
Start: 2025-04-17 | End: 2025-04-27

## 2025-04-17 NOTE — TELEPHONE ENCOUNTER
Pt left vm stating she is have the same symptoms as when she had diverticulitis.    Loose BM/diarrhea, LLQ abd pain, no vomiting, is eating a bland diet..      Pt had C'scope in 09/2024, per report, diverticulosis in left colon.      Per DALE Arnold, ok to send in Cipro and flagyl as long as pt is not allergic..      I left  for pt to call office. heena

## 2025-04-17 NOTE — TELEPHONE ENCOUNTER
Spoke with pt.  Confirmed no allergy to cipro or flagyl..  advised to increase fiber and avoid seeds/nuts. Will call office if no improvement. Pt voiced understanding. heena

## 2025-04-28 ENCOUNTER — TRANSCRIBE ORDERS (OUTPATIENT)
Dept: ADMINISTRATIVE | Facility: HOSPITAL | Age: 70
End: 2025-04-28
Payer: MEDICARE

## 2025-04-28 DIAGNOSIS — R91.8 MASS OF LEFT LUNG: Primary | ICD-10-CM

## 2025-05-06 ENCOUNTER — HOSPITAL ENCOUNTER (OUTPATIENT)
Dept: PET IMAGING | Facility: HOSPITAL | Age: 70
Discharge: HOME | End: 2025-05-06
Payer: MEDICARE

## 2025-05-06 DIAGNOSIS — R91.8 MASS OF LEFT LUNG: ICD-10-CM

## 2025-05-06 PROCEDURE — 34310000005 FLUDEOXYGLUCOSE F18 SOLUTION: Performed by: INTERNAL MEDICINE

## 2025-05-06 PROCEDURE — A9552 F18 FDG: HCPCS | Performed by: INTERNAL MEDICINE

## 2025-05-06 PROCEDURE — 78815 PET IMAGE W/CT SKULL-THIGH: CPT

## 2025-05-06 RX ADMIN — FLUDEOXYGLUCOSE F 18 1 DOSE: 200 INJECTION, SOLUTION INTRAVENOUS at 10:25

## 2025-05-29 ENCOUNTER — HOSPITAL ENCOUNTER (OUTPATIENT)
Dept: CT IMAGING | Facility: HOSPITAL | Age: 70
Discharge: HOME | End: 2025-05-29
Payer: MEDICARE

## 2025-05-29 DIAGNOSIS — R10.32 LLQ PAIN: ICD-10-CM

## 2025-05-29 DIAGNOSIS — Z87.19 HISTORY OF DIVERTICULITIS: ICD-10-CM

## 2025-05-29 PROCEDURE — 74177 CT ABD & PELVIS W/CONTRAST: CPT

## 2025-05-29 PROCEDURE — 25510000001 IOPAMIDOL PER 1 ML

## 2025-05-29 RX ORDER — IOPAMIDOL 755 MG/ML
100 INJECTION, SOLUTION INTRAVASCULAR
Status: COMPLETED | OUTPATIENT
Start: 2025-05-29 | End: 2025-05-29

## 2025-05-29 RX ADMIN — IOPAMIDOL 100 ML: 755 INJECTION, SOLUTION INTRAVENOUS at 16:02

## 2025-07-14 ENCOUNTER — OFFICE VISIT (OUTPATIENT)
Dept: GASTROENTEROLOGY | Facility: CLINIC | Age: 70
End: 2025-07-14
Payer: MEDICARE

## 2025-07-14 VITALS
SYSTOLIC BLOOD PRESSURE: 135 MMHG | HEART RATE: 73 BPM | BODY MASS INDEX: 26.07 KG/M2 | OXYGEN SATURATION: 98 % | DIASTOLIC BLOOD PRESSURE: 74 MMHG | HEIGHT: 67 IN | WEIGHT: 166.1 LBS

## 2025-07-14 DIAGNOSIS — R19.7 DIARRHEA, UNSPECIFIED TYPE: ICD-10-CM

## 2025-07-14 DIAGNOSIS — D50.9 IRON DEFICIENCY ANEMIA, UNSPECIFIED IRON DEFICIENCY ANEMIA TYPE: ICD-10-CM

## 2025-07-14 DIAGNOSIS — Z87.19 HISTORY OF DIVERTICULITIS: Primary | ICD-10-CM

## 2025-07-14 DIAGNOSIS — Z86.0100 HISTORY OF COLON POLYPS: ICD-10-CM

## 2025-07-14 DIAGNOSIS — K21.9 GASTROESOPHAGEAL REFLUX DISEASE, UNSPECIFIED WHETHER ESOPHAGITIS PRESENT: ICD-10-CM

## 2025-07-14 DIAGNOSIS — Z80.0 FAMILY HISTORY OF COLON CANCER: ICD-10-CM

## 2025-07-14 NOTE — PATIENT INSTRUCTIONS
Start a daily probiotic (activa)   Continue protonix 40 mg daily   Continue iron every 3 days per oncology

## 2025-07-14 NOTE — PROGRESS NOTES
Chief Complaint   Diverticulitis and Lung Cancer    History of Present Illness       Lorena Thorne is a 69 y.o. female who presents to [facility] for follow-up         History of Present Illness  The patient is a 69-year-old female who presents today for an office follow-up. She has a history of diverticulitis and was recently treated for another episode. She was last seen in the office by Dr. Valerio in 07/2024. A recent CT scan of the abdomen and pelvis done in 05/2025 for suspected diverticulitis, due to abdominal pain, showed mild acute diverticulitis involving the mid sigmoid colon with no abscess or evidence of perforation. Her last colonoscopy was in 09/2024 with Dr. Valerio, which showed diverticulosis in the left colon and was otherwise normal. Her next screening colonoscopy will be due in 09/2029.    She reports that her condition has improved, but she continues to experience diarrhea, typically in the mornings with loose stools. She does not report any urgency or accidents. She believes the urgency was related to her diverticulitis, which has not completely resolved. She has lost approximately 10 pounds, which she attributes to a flare-up of diverticulitis. She is not currently losing weight and reports no abdominal pain. She is not taking any probiotics at this time.    She has been diagnosed with carcinoid, which her doctors believe could be contributing to her current diarrhea and past issues. She is still undergoing testing, including a dotatate PET scan on 07/11/2025 and a 24-hour urine collection on 07/13/2025. She is scheduled to meet with her oncologist on 07/23/2025 to discuss a treatment plan.    She was found to be anemic during a visit to her primary care doctor in 04/2025 and was prescribed slow iron, which she takes once every 3 days. She has not had any blood tests since then.    She has a history of hiatal hernia and takes Protonix daily, which she finds  "effective.    SOCIAL HISTORY  Marital Status:             Results       Result Review :                           Lipase   Lipase   Date Value Ref Range Status   04/25/2021 37 5 - 51 U/L Final     Amylase No results found for: \"AMYLASE\"  Iron Profile No results found for: \"IRON\", \"TIBC\", \"LABIRON\", \"TRANSFERRIN\"  Ferritin No results found for: \"FERRITIN\"  ESR (Sed Rate) No results found for: \"SEDRATE\"  CRP (C-Reactive) No results found for: \"CRP\"  Liver Workup No results found for: \"AFPTM\", \"DSDNA\", \"EXPANDEDENA\", \"SMOOTHMUSCAB\", \"CERULOPLSM\", \"FERRITIN\", \"LABIMMURE\", \"TOTIGGREF\", \"IGA\", \"IGM\", \"IRON\", \"TIBC\", \"LABIRON\", \"TRANSFERRIN\", \"MITOAB\", \"PROTIME\", \"INR\", \"AFP\"  Acute HEP Panel   Hepatitis B Surface Ag   Date Value Ref Range Status   12/31/2020 Non-Reactive Non-Reactive Final     Hepatitis C Ab   Date Value Ref Range Status   12/31/2020 Non-Reactive Non-Reactive Final     Alpha 1 No results found for: \"AFPTM\"  BENJAMIN No results found for: \"DSDNA\", \"EXPANDEDENA\"            Results  Imaging   - CT scan of the abdomen and pelvis: 05/2025, Mild acute diverticulitis involving the mid sigmoid colon with no abscess or evidence of perforation.   - PET scan: 07/11/2025, Hypermetabolic left upper lobe nodule, small 0.7 cm left lower lobe lung nodule, mild opacity at the lung bases favoring atelectasis over fibrosis.      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      CATARACT EXTRACTION       SECTION      CHOLECYSTECTOMY      COLONOSCOPY  May 2021    COLONOSCOPY N/A 2024    Procedure: COLONOSCOPY;  Surgeon: Hernán Valerio MD;  Location: [facility] ENDOSCOPY;  Service: Gastroenterology;  Laterality: N/A;  DIVERTICULOSIS    CYST REMOVAL Right     THUMB    ENDOSCOPY N/A 03/15/2023    Procedure: ESOPHAGOGASTRODUODENOSCOPY WITH BIOPSIES;  Surgeon: Hernán Valerio MD;  Location: [facility] ENDOSCOPY;  Service: Gastroenterology;  Laterality: N/A;  SMALL HIATAL HERNIA    HYSTERECTOMY      secondary to uterine fibroids/PROLAPSE, HEAVY BLEEDING    SACROCOLPOPEXY N/A 2020    Procedure: ROBOTIC LAPAROSCOPIC SACRAL COLPOPEXY, BILATERAL SALPINGO-OOPHORECTOMY, CYSTOSCOPY, POSTERIOR REPAIR, CYSTOTOMY REPAIR;  Surgeon: Filiberto Alberts MD;  Location: [facility];  Service: [facility];  Laterality: N/A;    UPPER GASTROINTESTINAL ENDOSCOPY  May 2021         Current Outpatient Medications:     amLODIPine (NORVASC) 5 MG tablet, , Disp: , Rfl:     aspirin 81 MG EC tablet, Take 1 tablet by mouth Daily. HELD, Disp: , Rfl:     buPROPion SR (WELLBUTRIN SR) 100 MG 12 hr tablet, Take 1 tablet by mouth 2 (Two) Times a Day., Disp: , Rfl:     Calcium Carbonate 1500 (600 Ca) MG tablet, Calcium 600 600 mg (1,500 mg) oral tablet take 1 tablet by oral route daily   Suspended, Disp: , Rfl:     estradiol (ESTRACE) 0.1 MG/GM vaginal cream, Insert 1 g into the vagina 2 (Two) Times a Week., Disp: 42.5 g, Rfl: 4    hydroCHLOROthiazide 12.5 MG tablet, 1 tablet., Disp: , Rfl:     montelukast (SINGULAIR) 10 MG tablet, Take 1 tablet by mouth every night at bedtime., Disp: , Rfl:     multivitamin with minerals tablet tablet, Take 1 tablet by mouth Daily., Disp: , Rfl:     naproxen (NAPROSYN) 500 MG tablet, As Needed., Disp: , Rfl:     pantoprazole (PROTONIX) 40 MG EC tablet, TAKE 1 TABLET BY MOUTH DAILY, Disp: 30 tablet, Rfl: 5    Restasis 0.05 % ophthalmic emulsion, Administer  "1 drop to both eyes 2 (Two) Times a Day., Disp: , Rfl:     simvastatin (ZOCOR) 20 MG tablet, Take 1 tablet by mouth Every Night., Disp: , Rfl:     solifenacin (VESIcare) 5 MG tablet, Take 1 tablet by mouth Daily., Disp: 90 tablet, Rfl: 3     Allergies   Allergen Reactions    Amoxicillin Unknown - High Severity    Clavulanic Acid GI Intolerance     Other reaction(s): GI Intolerance       Family History   Problem Relation Age of Onset    Osteoporosis Mother     Lung cancer Father     Breast cancer Paternal Grandmother     Colon cancer Daughter     Ulcerative colitis Daughter     Malig Hyperthermia Neg Hx     Ovarian cancer Neg Hx     Uterine cancer Neg Hx     Prostate cancer Neg Hx         Social History     Social History Narrative    Not on file       Objective       Review of Systems   Constitutional:  Negative for appetite change, fatigue, fever, unexpected weight gain and unexpected weight loss.   HENT:  Negative for trouble swallowing.    Respiratory:  Negative for cough, choking, chest tightness, shortness of breath, wheezing and stridor.    Cardiovascular:  Negative for chest pain, palpitations and leg swelling.   Gastrointestinal:  Positive for diarrhea. Negative for abdominal distention, abdominal pain, anal bleeding, blood in stool, constipation, nausea, rectal pain, vomiting, GERD and indigestion.        Vital Signs:   /74 (BP Location: Left arm, Patient Position: Sitting, Cuff Size: Adult)   Pulse 73   Ht 170.2 cm (67\")   Wt 75.3 kg (166 lb 1.6 oz)   SpO2 98%   BMI 26.01 kg/m²       Physical Exam  Constitutional:       General: She is not in acute distress.     Appearance: She is well-developed. She is not ill-appearing.   HENT:      Head: Normocephalic.   Eyes:      Pupils: Pupils are equal, round, and reactive to light.   Cardiovascular:      Rate and Rhythm: Normal rate and regular rhythm.      Heart sounds: Normal heart sounds.   Pulmonary:      Effort: Pulmonary effort is normal.      " Breath sounds: Normal breath sounds.   Abdominal:      General: Bowel sounds are normal. There is no distension.      Palpations: Abdomen is soft. There is no mass.      Tenderness: There is no abdominal tenderness. There is no guarding or rebound.      Hernia: No hernia is present.   Musculoskeletal:         General: Normal range of motion.   Skin:     General: Skin is warm and dry.   Neurological:      Mental Status: She is alert and oriented to person, place, and time.   Psychiatric:         Speech: Speech normal.         Behavior: Behavior normal.         Judgment: Judgment normal.         Physical Exam          Assessment & Plan          Assessment and Plan    Diagnoses and all orders for this visit:    1. History of diverticulitis (Primary)    2. Diarrhea, unspecified type  -     Calprotectin, Fecal - Stool, Per Rectum; Future  -     Clostridioides difficile Toxin - Stool, Per Rectum; Future  -     Enteric Bacterial Panel - Stool, Per Rectum; Future  -     Enteric Parasite Panel - Stool, Per Rectum; Future  -     Pancreatic Elastase, Fecal - Stool, Per Rectum; Future    3. Iron deficiency anemia, unspecified iron deficiency anemia type    4. History of colon polyps    5. Gastroesophageal reflux disease, unspecified whether esophagitis present    6. Family history of colon cancer        Assessment & Plan  1. Diarrhea/history of diverticulitis:  -Finished antibiotics.  No longer having the abdominal pain or cramping.  Still having some loose stools.  - Conduct stool studies to rule out underlying infection.  - Take over-the-counter Imodium preemptively if needed, one tablet 30 minutes before leaving and up to two more tablets within two hours, not exceeding eight tablets in a day.  - Start a daily probiotic with yogurt (Activia).  - Continue Protonix regimen.  - Contact the office immediately if diarrhea worsens.      2. Anemia:  - Continue slow iron every three days as directed.  - Hemoglobin levels monitored  by oncologist.  - Inform if there are any changes in condition.    3. Carcinoid tumor:  - Undergoing testing, including recent dotatate PET scan and 24-hour urine collection.  - Meet with oncologist on 07/23/2025 to discuss treatment plan.  - Inform if oncologist recommends further scopes or procedures.    4. Hiatal hernia:  - Continue taking Protonix every morning.    Follow-up: 3 months from now.          Follow Up       Follow Up   Return in about 3 months (around 10/14/2025).  Patient was given instructions and counseling regarding her condition or for health maintenance advice. Please see specific information pulled into the AVS if appropriate.       Patient or patient representative verbalized consent for the use of Ambient Listening during the visit with  DALE Lundberg for chart documentation. 7/14/2025  12:39 EDT

## 2025-07-16 LAB
027 TOXIN: NORMAL
C DIFF TOX GENS STL QL NAA+PROBE: NEGATIVE

## 2025-07-16 PROCEDURE — 87506 IADNA-DNA/RNA PROBE TQ 6-11: CPT | Performed by: NURSE PRACTITIONER

## 2025-07-16 PROCEDURE — 82653 EL-1 FECAL QUANTITATIVE: CPT | Performed by: NURSE PRACTITIONER

## 2025-07-16 PROCEDURE — 87493 C DIFF AMPLIFIED PROBE: CPT | Performed by: NURSE PRACTITIONER

## 2025-07-16 PROCEDURE — 83993 ASSAY FOR CALPROTECTIN FECAL: CPT | Performed by: NURSE PRACTITIONER

## 2025-07-17 LAB
C COLI+JEJ+UPSA DNA STL QL NAA+NON-PROBE: NOT DETECTED
CRYPTOSP DNA STL QL NAA+NON-PROBE: NOT DETECTED
E HISTOLYT DNA STL QL NAA+NON-PROBE: NOT DETECTED
EC STX1+STX2 GENES STL QL NAA+NON-PROBE: NOT DETECTED
G LAMBLIA DNA STL QL NAA+NON-PROBE: NOT DETECTED
S ENT+BONG DNA STL QL NAA+NON-PROBE: NOT DETECTED
SHIGELLA SP+EIEC IPAH ST NAA+NON-PROBE: NOT DETECTED

## 2025-07-18 LAB
CALPROTECTIN STL-MCNT: 94 UG/G (ref 0–120)
ELASTASE PANC STL-MCNT: >800 UG ELAST./G

## 2025-07-21 ENCOUNTER — RESULTS FOLLOW-UP (OUTPATIENT)
Dept: OBSTETRICS AND GYNECOLOGY | Age: 70
End: 2025-07-21
Payer: MEDICARE

## 2025-07-22 NOTE — TELEPHONE ENCOUNTER
Patient was notified of her results and verbalized understanding. Patient stated that she is still having very loose bowel movements still but that she sees oncology in a day or two and will get some answers whether its related to her cancer or not.

## 2025-07-29 RX ORDER — PANTOPRAZOLE SODIUM 40 MG/1
40 TABLET, DELAYED RELEASE ORAL DAILY
Qty: 30 TABLET | Refills: 0 | Status: SHIPPED | OUTPATIENT
Start: 2025-07-29

## 2025-07-29 NOTE — TELEPHONE ENCOUNTER
Medication Requested Pantoprazole 40mg    Last Refill 1/30/2025    Last OV 7/14/2025    Next OV None    Medication pended for approval and correct pharmacy verified Yes

## 2025-08-25 RX ORDER — PANTOPRAZOLE SODIUM 40 MG/1
40 TABLET, DELAYED RELEASE ORAL DAILY
Qty: 30 TABLET | Refills: 11 | Status: SHIPPED | OUTPATIENT
Start: 2025-08-25

## (undated) DEVICE — COLUMN DRAPE

## (undated) DEVICE — SKIN PREP TRAY W/CHG: Brand: MEDLINE INDUSTRIES, INC.

## (undated) DEVICE — 30977 SEE SHARP - ENHANCED INTRAOPERATIVE LAPAROSCOPE CLEANING & DEFOGGING: Brand: 30977 SEE SHARP - ENHANCED INTRAOPERATIVE LAPAROSCOPE CLEANING & DEFOGGING

## (undated) DEVICE — ARM DRAPE

## (undated) DEVICE — DEV SUT GRSPR CLOSUR 15CM 14G

## (undated) DEVICE — SAFESECURE,SECUREMENT,FOLEY CATH,STERILE: Brand: MEDLINE

## (undated) DEVICE — MEDICINE CUP, GRADUATED, STER: Brand: MEDLINE

## (undated) DEVICE — LINER SURG CANSTR SXN S/RIGD 1500CC

## (undated) DEVICE — NEEDLE, QUINCKE, 18GX3.5": Brand: MEDLINE

## (undated) DEVICE — SOLIDIFIER LIQLOC PLS 1500CC BT

## (undated) DEVICE — SUT PDS 2/0 SH 27IN Z317H

## (undated) DEVICE — GLV SURG PREMIERPRO ORTHO LTX PF SZ8 BRN

## (undated) DEVICE — 3M™ STERI-STRIP™ REINFORCED ADHESIVE SKIN CLOSURES, R1547, 1/2 IN X 4 IN (12 MM X 100 MM), 6 STRIPS/ENVELOPE: Brand: 3M™ STERI-STRIP™

## (undated) DEVICE — TIP COVER ACCESSORY

## (undated) DEVICE — CONN JET HYDRA H20 AUXILIARY DISP

## (undated) DEVICE — LOU LITHOTOMY ROBOTIC: Brand: MEDLINE INDUSTRIES, INC.

## (undated) DEVICE — ST IRR CYSTO W/SPK 77IN LF

## (undated) DEVICE — DRAPE,UNDERBUTTOCKS,PCH,STERILE: Brand: MEDLINE

## (undated) DEVICE — Device

## (undated) DEVICE — Device: Brand: DEFENDO AIR/WATER/SUCTION AND BIOPSY VALVE

## (undated) DEVICE — SUT VIC 0/0 UR6 27IN DYED J603H

## (undated) DEVICE — SOL IRRG H2O PL/BG 1000ML STRL

## (undated) DEVICE — LAPAROSCOPIC SMOKE FILTRATION SYSTEM: Brand: PALL LAPAROSHIELD® PLUS LAPAROSCOPIC SMOKE FILTRATION SYSTEM

## (undated) DEVICE — COUNT NDL MAG FM/BLCK 40COUNT

## (undated) DEVICE — 3M™ STERI-STRIP™ COMPOUND BENZOIN TINCTURE 40 BAGS/CARTON 4 CARTONS/CASE C1544: Brand: 3M™ STERI-STRIP™

## (undated) DEVICE — SINGLE-USE BIOPSY FORCEPS: Brand: RADIAL JAW 4

## (undated) DEVICE — DECANT BG O JET

## (undated) DEVICE — ENDOPATH XCEL BLADELESS TROCARS WITH STABILITY SLEEVES: Brand: ENDOPATH XCEL

## (undated) DEVICE — SUT VIC 0 TIES 18IN J912G

## (undated) DEVICE — CANNULA SEAL

## (undated) DEVICE — COVER,MAYO STAND,STERILE: Brand: MEDLINE

## (undated) DEVICE — SOL ANTISTICK CAUTRY ELECTROLUBE LF

## (undated) DEVICE — SUT PROLN 2/0 SH 36IN 8523H

## (undated) DEVICE — SUT MNCRYL 2/0 SH 27IN Y317H

## (undated) DEVICE — BNDG ADHS PLSTC 1X3IN LF

## (undated) DEVICE — SOL NACL 0.9PCT 1000ML

## (undated) DEVICE — CATHETER,FOLEY,100%SILICONE,16FR,10ML,LF: Brand: MEDLINE

## (undated) DEVICE — SUT PDS 0 CT1 36IN Z346H

## (undated) DEVICE — BLCK/BITE BLOX WO/DENTL/RIM W/STRAP 54F

## (undated) DEVICE — ENDOPOUCH RETRIEVER SPECIMEN RETRIEVAL BAGS: Brand: ENDOPOUCH RETRIEVER

## (undated) DEVICE — SUT MNCRYL PLS ANTIB UD 4/0 PS2 18IN

## (undated) DEVICE — VIOLET POLYDIOXANONE POLYMER, SYNTHETIC ABSORBABLE SUTURE CLIPS: Brand: LAPRA-TY

## (undated) DEVICE — BLADELESS OBTURATOR: Brand: WECK VISTA